# Patient Record
Sex: MALE | Race: WHITE | NOT HISPANIC OR LATINO | Employment: FULL TIME | URBAN - METROPOLITAN AREA
[De-identification: names, ages, dates, MRNs, and addresses within clinical notes are randomized per-mention and may not be internally consistent; named-entity substitution may affect disease eponyms.]

---

## 2023-03-21 PROBLEM — R68.89 FLU-LIKE SYMPTOMS: Status: ACTIVE | Noted: 2023-03-21

## 2023-03-21 PROBLEM — E66.812 CLASS 2 SEVERE OBESITY WITH SERIOUS COMORBIDITY AND BODY MASS INDEX (BMI) OF 37.0 TO 37.9 IN ADULT: Status: ACTIVE | Noted: 2023-03-21

## 2023-03-21 PROBLEM — M25.559 HIP PAIN: Status: ACTIVE | Noted: 2023-03-21

## 2023-03-21 PROBLEM — M54.16 LUMBAR RADICULAR PAIN: Status: ACTIVE | Noted: 2023-03-21

## 2023-03-21 PROBLEM — M79.645 PAIN OF FINGER OF LEFT HAND: Status: ACTIVE | Noted: 2023-03-21

## 2023-03-21 PROBLEM — R23.4 SKIN ESCHAR: Status: ACTIVE | Noted: 2023-03-21
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
## 2023-03-21 PROBLEM — M79.606 ACUTE LEG PAIN: Status: ACTIVE | Noted: 2023-03-21

## 2023-03-21 PROBLEM — K21.9 GASTROESOPHAGEAL REFLUX DISEASE: Status: ACTIVE | Noted: 2023-03-21

## 2023-03-21 PROBLEM — M18.12 DEGENERATIVE ARTHRITIS OF THUMB, LEFT: Status: ACTIVE | Noted: 2023-03-21

## 2023-03-21 PROBLEM — M51.35 DDD (DEGENERATIVE DISC DISEASE), THORACOLUMBAR: Status: ACTIVE | Noted: 2023-03-21

## 2023-03-21 PROBLEM — E03.8 SECONDARY HYPOTHYROIDISM: Status: ACTIVE | Noted: 2023-03-21

## 2023-03-21 PROBLEM — E66.01 CLASS 2 SEVERE OBESITY WITH SERIOUS COMORBIDITY AND BODY MASS INDEX (BMI) OF 37.0 TO 37.9 IN ADULT (MULTI): Status: ACTIVE | Noted: 2023-03-21

## 2023-03-21 PROBLEM — M25.562 LEFT KNEE PAIN: Status: ACTIVE | Noted: 2023-03-21

## 2023-03-21 PROBLEM — M54.9 UPPER BACK PAIN ON RIGHT SIDE: Status: ACTIVE | Noted: 2023-03-21

## 2023-03-21 PROBLEM — M51.37 DDD (DEGENERATIVE DISC DISEASE), LUMBOSACRAL: Status: ACTIVE | Noted: 2023-03-21

## 2023-03-21 PROBLEM — J30.9 AR (ALLERGIC RHINITIS): Status: ACTIVE | Noted: 2023-03-21

## 2023-03-21 PROBLEM — N52.9 MALE ERECTILE DISORDER: Status: ACTIVE | Noted: 2023-03-21

## 2023-03-21 PROBLEM — M51.379 DDD (DEGENERATIVE DISC DISEASE), LUMBOSACRAL: Status: ACTIVE | Noted: 2023-03-21

## 2023-03-21 PROBLEM — E78.5 DYSLIPIDEMIA: Status: ACTIVE | Noted: 2023-03-21

## 2023-03-21 PROBLEM — R35.1 NOCTURIA: Status: ACTIVE | Noted: 2023-03-21

## 2023-03-21 PROBLEM — M18.11 DEGENERATIVE ARTHRITIS OF THUMB, RIGHT: Status: ACTIVE | Noted: 2023-03-21

## 2023-03-21 PROBLEM — I10 BENIGN ESSENTIAL HTN: Status: ACTIVE | Noted: 2023-03-21

## 2023-03-21 PROBLEM — M25.579 ANKLE PAIN: Status: ACTIVE | Noted: 2023-03-21

## 2023-03-21 PROBLEM — M54.31 SCIATICA OF RIGHT SIDE: Status: ACTIVE | Noted: 2023-03-21

## 2023-03-21 PROBLEM — S29.019A THORACIC MYOFASCIAL STRAIN: Status: ACTIVE | Noted: 2023-03-21

## 2023-03-21 RX ORDER — LISINOPRIL 5 MG/1
1 TABLET ORAL DAILY
COMMUNITY
Start: 2014-08-19 | End: 2023-05-08 | Stop reason: SDUPTHER

## 2023-03-21 RX ORDER — LEVOTHYROXINE SODIUM 50 UG/1
TABLET ORAL
COMMUNITY
Start: 2014-08-19 | End: 2023-05-08 | Stop reason: SDUPTHER

## 2023-03-21 RX ORDER — ALBUTEROL SULFATE 90 UG/1
2 AEROSOL, METERED RESPIRATORY (INHALATION) EVERY 6 HOURS
COMMUNITY
End: 2023-05-08 | Stop reason: SDUPTHER

## 2023-03-21 RX ORDER — METHOCARBAMOL 500 MG/1
1 TABLET, FILM COATED ORAL 3 TIMES DAILY
COMMUNITY
End: 2023-09-25 | Stop reason: ALTCHOICE

## 2023-03-27 ENCOUNTER — OFFICE VISIT (OUTPATIENT)
Dept: PRIMARY CARE | Facility: CLINIC | Age: 68
End: 2023-03-27
Payer: COMMERCIAL

## 2023-03-27 VITALS
SYSTOLIC BLOOD PRESSURE: 130 MMHG | OXYGEN SATURATION: 97 % | HEIGHT: 67 IN | DIASTOLIC BLOOD PRESSURE: 82 MMHG | HEART RATE: 60 BPM | WEIGHT: 232 LBS | BODY MASS INDEX: 36.41 KG/M2 | TEMPERATURE: 98 F

## 2023-03-27 DIAGNOSIS — Z00.00 VISIT FOR PREVENTIVE HEALTH EXAMINATION: Primary | ICD-10-CM

## 2023-03-27 DIAGNOSIS — F17.200 SMOKER: ICD-10-CM

## 2023-03-27 DIAGNOSIS — Z12.11 COLON CANCER SCREENING: ICD-10-CM

## 2023-03-27 PROCEDURE — 99397 PER PM REEVAL EST PAT 65+ YR: CPT | Performed by: FAMILY MEDICINE

## 2023-03-27 PROCEDURE — 1160F RVW MEDS BY RX/DR IN RCRD: CPT | Performed by: FAMILY MEDICINE

## 2023-03-27 PROCEDURE — 3079F DIAST BP 80-89 MM HG: CPT | Performed by: FAMILY MEDICINE

## 2023-03-27 PROCEDURE — 4004F PT TOBACCO SCREEN RCVD TLK: CPT | Performed by: FAMILY MEDICINE

## 2023-03-27 PROCEDURE — 3075F SYST BP GE 130 - 139MM HG: CPT | Performed by: FAMILY MEDICINE

## 2023-03-27 PROCEDURE — 1159F MED LIST DOCD IN RCRD: CPT | Performed by: FAMILY MEDICINE

## 2023-03-27 NOTE — PROGRESS NOTES
Subjective   Patient ID: Miki Brown is a 67 y.o. male who presents for Annual Exam, Hypertension (t), Thyroid Problem, and GERD.  Is pt fasting? yes  Does pt see any providers other than care team below: none    No care team member to display    Very pleasant 67-year-old here today for annual wellness exam  No hospital ER visits surgery or interval changes  He had COVID but recovered  He is a smoker and is trying to quit  No new family history no falls or accidents no new concerns  No difficulty urinating no angina palpitations or syncope no change in bowel or bladder habits      Last labs-  Due for labs- yes    Cholesterol   Date Value Ref Range Status   03/20/2018 183 0 - 199 mg/dL Final     Comment:     .      AGE      DESIRABLE   BORDERLINE HIGH   HIGH     0-19 Y     0 - 169       170 - 199     >/= 200    20-24 Y     0 - 189       190 - 224     >/= 225         >24 Y     0 - 199       200 - 239     >/= 240   **All ranges are based on fasting samples. Specific   therapeutic targets will vary based on patient-specific   cardiac risk.  .   Pediatric guidelines reference:Pediatrics 2011, 128(S5).   Adult guidelines reference: NCEP ATPIII Guidelines,     RACHEL 2001, 258:2486-97  .   Venipuncture immediately after or during the    administration of Metamizole may lead to falsely   low results. Testing should be performed immediately   prior to Metamizole dosing.       Triglycerides   Date Value Ref Range Status   03/20/2018 78 0 - 149 mg/dL Final     Comment:     .      AGE      DESIRABLE   BORDERLINE HIGH   HIGH     VERY HIGH   0 D-90 D    19 - 174         ----         ----        ----  91 D- 9 Y     0 -  74        75 -  99     >/= 100      ----    10-19 Y     0 -  89        90 - 129     >/= 130      ----    20-24 Y     0 - 114       115 - 149     >/= 150      ----         >24 Y     0 - 149       150 - 199    200- 499    >/= 500  .   Venipuncture immediately after or during the    administration of Metamizole may  lead to falsely   low results. Testing should be performed immediately   prior to Metamizole dosing.       HDL   Date Value Ref Range Status   03/20/2018 50.6 mg/dL Final     Comment:     .      AGE      VERY LOW   LOW     NORMAL    HIGH       0-19 Y       < 35   < 40     40-45     ----    20-24 Y       ----   < 40       >45     ----      >24 Y       ----   < 40     40-60      >60  .       No results found for: LDL  TSH   Date Value Ref Range Status   03/20/2018 0.59 0.44 - 3.98 mIU/L Final     Comment:      TSH testing is performed using different testing    methodology at [facility] than at other    [facility] hospitals. Direct result comparisons should    only be made within the same method.  .   Patients receiving more than 5 mg/day of biotin may have interference   in test results.  A sample should be taken no sooner than eight hours   after  previous dose. Contact [phone] for additional information.       No components found for: A1C  No components found for: POCA1C  No results found for: ALBUR  No components found for: POCALBUR      Other concerns:    bps at home- stable and normal    ER/urgicare visits in the last year- none  Hospitalizations in the last year- none      last Pap- (age 21-29 q3yrs pap only; age 21-24 gc/chl; age 30-65 q5yrs pap/hpv; age 66+ stop if 3 neg pap or 2 neg HPV within 10yrs and last one in last 5yrs and no h/o VISHAL 2+; stop if hyster with cervix removed and no cervical ca h/o or no high grade pre-cancer history)-n/a  H/o abn pap-  Frequency-  Duration-  Heavy periods-  Abn uterine bleeding-  Dysmenorrhea-  FH ovarian, endometrial, cervical, uterine ca-    Current birth control method-  No change in contraception desired    urine or stool incontinence-  Postmenopausal bleeding/spotting-    last mammo- (40-75/90)  Self breast exams-  Offer CBE 20-39; 40-65+  FH br ca-    last colonoscopy/cologuard/FIT (45-75)  FH colon ca-  FH prostate ca-    last bone density-(age  65-85) or if fx after age 50)    lung ca screening (age 50-75 and 1 ppd x 20yrs and currently smoke or quit within 15yrs)    AAA screening-men 65-75 who smoked >100 cigs in a lifetime)    Exercise- rides bike   Diet-fairly healthy   Body mass index is 36.34 kg/m².    last eye dr appt-   No vision issues    last dental appt- regularly    BMs-regular  Sleep-able to fall asleep and stay asleep; no snoring or apnea  no cp, swelling, sob, abd pain, n/v/d/c, blood in stool or black stools  STI testing including hiv (age 15-65) and hep c screening (18-79)-colon up to date   PSA 50-85      Immunization History   Administered Date(s) Administered    DTaP, Unspecified 10/12/2000    Influenza, seasonal, injectable 09/01/2022    Moderna SARS-CoV-2 Vaccination 04/16/2021, 05/14/2021, 11/19/2021, 04/12/2022    Moderna Sars-cov-2 Bivalent Booster 09/19/2022    Pneumococcal Polysaccharide PPSV23 11/30/2016    TD (adult), 2 Lf tetanus toxoid, preservative free, adsorbed 01/15/2008    Tdap 03/20/2018    Zoster, Unspecified 05/28/2020, 07/28/2020    Zoster, live 09/09/2015, 11/10/2015       fractures in lifetime-none  FH hip/spine/wrist/humerus fx in mom, dad or sibs-    FH heart attack, heart surgery-yes sister abnd brother  FH stroke-no    The ASCVD Risk score (Shakila DK, et al., 2019) failed to calculate for the following reasons:    Cannot find a previous HDL lab    Cannot find a previous total cholesterol lab  Coronary Artery Calcium score:  This test is recommended for men 45 or older and women 55 or older without a history of heart disease and have 1 risk factor (high LDL cholesterol, low HDL cholesterol, high blood pressure, smoker (current or past), type 2 diabetes, IBD, lupus, RA, ankylosing spondylitis, psoriasis or family history of  heart disease <55yrs in dad, brother or child or <65yrs in mom, sister, or child.)       Review of Systems  Constitutional: no chills, no fever and no night sweats.   Eyes: no blurred vision  and no eyesight problems.   ENT: no hearing loss, no nasal congestion, no nasal discharge, no hoarseness and no sore throat.   Cardiovascular: no chest pain, no intermittent leg claudication, no lower extremity edema, no palpitations and no syncope.   Respiratory: no cough, no shortness of breath during exertion, no shortness of breath at rest and no wheezing.   Gastrointestinal: no abdominal pain, no blood in stools, no constipation, no diarrhea, no melena, no nausea, no rectal pain and no vomiting.   Genitourinary: no dysuria, no change in urinary frequency, no urinary hesitancy, no feelings of urinary urgency and no vaginal discharge.   Musculoskeletal: no arthralgias,  no back pain and no myalgias.   Integumentary: no new skin lesions and no rashes.   Neurological: no difficulty walking, no headache, no limb weakness, no numbness and no tingling.   Psychiatric: no anxiety, no depression, no anhedonia and no substance use disorders.   Endocrine: no recent weight gain and no recent weight loss.   Hematologic/Lymphatic: no tendency for easy bruising and no swollen glands .  Objective   Visit Vitals  /82   Pulse 60   Temp 36.7 °C (98 °F) (Oral)      BP Readings from Last 3 Encounters:   03/27/23 130/82   11/16/22 153/86   03/25/22 120/74     Wt Readings from Last 3 Encounters:   03/27/23 105 kg (232 lb)   11/16/22 99.8 kg (220 lb)   03/25/22 103 kg (226 lb 12.8 oz)           Physical Exam  The patient appeared well nourished and normally developed. Vital signs as documented. Head exam is unremarkable. No scleral icterus or corneal arcus noted.  Pupils are equal round reactive to light extraocular movements are intact no hemorrhages noted on funduscopic exam mouth mucous membranes are moist no exudates ears canals clear TMs are gray pearly not injected nose no rhinorrhea or epistaxis Neck is without jugular venous distension, thyromegaly, or carotid bruits. Carotid upstrokes are brisk bilaterally. Lungs are  clear to auscultation and percussion. Cardiac exam reveals the PMI to be normally sized and situated. Rhythm is regular. First and second heart sounds normal. No murmurs, rubs or gallops. Abdominal exam reveals normal bowel sounds, no masses, no organomegaly and no aortic enlargement. Extremities are nonedematous and both femoral and pedal pulses are normal.  Neurologic exam DTRs are equal bilaterally no focal deficits strength is symmetrical heme lymph no palpable lymph nodes in the neck axilla or groin  Assessment/Plan   Diagnoses and all orders for this visit:  Visit for preventive health examination  -     Comprehensive Metabolic Panel; Future  -     Prostate Spec.Ag,Screen; Future  -     Lipid Panel; Future  -     Hemoglobin A1C; Future  -     Hepatitis C antibody; Future  Colon cancer screening  Smoker  -     Vascular US abdominal aorta anuerysm AAA screening; Future

## 2023-03-28 LAB
NON-UH HIE A/G RATIO: 1.4
NON-UH HIE ALB: 4.3 G/DL (ref 3.4–5)
NON-UH HIE ALK PHOS: 97 UNIT/L (ref 46–116)
NON-UH HIE BILIRUBIN, TOTAL: 0.7 MG/DL (ref 0.2–1)
NON-UH HIE BUN/CREAT RATIO: 15.4
NON-UH HIE BUN: 20 MG/DL (ref 9–23)
NON-UH HIE CALCIUM: 9.8 MG/DL (ref 8.7–10.4)
NON-UH HIE CALCULATED LDL CHOLESTEROL: 106 MG/DL (ref 60–130)
NON-UH HIE CALCULATED OSMOLALITY: 282 MOSM/KG (ref 275–295)
NON-UH HIE CHLORIDE: 106 MMOL/L (ref 98–107)
NON-UH HIE CHOLESTEROL: 176 MG/DL (ref 100–200)
NON-UH HIE CO2, VENOUS: 29 MMOL/L (ref 20–31)
NON-UH HIE CREATININE: 1.3 MG/DL (ref 0.6–1.1)
NON-UH HIE GFR AA: >60
NON-UH HIE GLOBULIN: 3.1 G/DL
NON-UH HIE GLOMERULAR FILTRATION RATE: 55 ML/MIN/1.73M?
NON-UH HIE GLUCOSE: 91 MG/DL (ref 74–106)
NON-UH HIE GOT: 32 UNIT/L (ref 15–37)
NON-UH HIE GPT: 28 UNIT/L (ref 10–49)
NON-UH HIE HDL CHOLESTEROL: 54 MG/DL (ref 40–60)
NON-UH HIE HEPATITIS C ANTIBODY: NONREACTIVE
NON-UH HIE HGB A1C: 5.7 %
NON-UH HIE K: 4.3 MMOL/L (ref 3.5–5.1)
NON-UH HIE NA: 140 MMOL/L (ref 135–145)
NON-UH HIE PROSTATIC SPECIFIC AG SCREEN: 0.3 NG/ML (ref 0–4)
NON-UH HIE TOTAL CHOL/HDL CHOL RATIO: 3.3
NON-UH HIE TOTAL PROTEIN: 7.4 G/DL (ref 5.7–8.2)
NON-UH HIE TRIGLYCERIDES: 79 MG/DL (ref 30–150)

## 2023-03-30 ASSESSMENT — ENCOUNTER SYMPTOMS
DEPRESSION: 0
OCCASIONAL FEELINGS OF UNSTEADINESS: 0
LOSS OF SENSATION IN FEET: 0

## 2023-05-08 ENCOUNTER — TELEPHONE (OUTPATIENT)
Dept: PRIMARY CARE | Facility: CLINIC | Age: 68
End: 2023-05-08
Payer: COMMERCIAL

## 2023-05-08 DIAGNOSIS — E03.8 SECONDARY HYPOTHYROIDISM: ICD-10-CM

## 2023-05-08 DIAGNOSIS — J44.9 CHRONIC OBSTRUCTIVE PULMONARY DISEASE, UNSPECIFIED COPD TYPE (MULTI): Primary | ICD-10-CM

## 2023-05-08 DIAGNOSIS — I10 BENIGN ESSENTIAL HTN: ICD-10-CM

## 2023-05-08 RX ORDER — LISINOPRIL 5 MG/1
5 TABLET ORAL DAILY
Qty: 90 TABLET | Refills: 0 | Status: SHIPPED | OUTPATIENT
Start: 2023-05-08 | End: 2023-08-02 | Stop reason: SDUPTHER

## 2023-05-08 RX ORDER — ALBUTEROL SULFATE 90 UG/1
2 AEROSOL, METERED RESPIRATORY (INHALATION) EVERY 6 HOURS
Qty: 18 G | Refills: 0 | Status: SHIPPED | OUTPATIENT
Start: 2023-05-08 | End: 2024-02-14 | Stop reason: SDUPTHER

## 2023-05-08 RX ORDER — LEVOTHYROXINE SODIUM 50 UG/1
50 TABLET ORAL
Qty: 90 TABLET | Refills: 0 | Status: SHIPPED | OUTPATIENT
Start: 2023-05-08 | End: 2023-08-02 | Stop reason: SDUPTHER

## 2023-08-02 ENCOUNTER — TELEPHONE (OUTPATIENT)
Dept: PRIMARY CARE | Facility: CLINIC | Age: 68
End: 2023-08-02
Payer: COMMERCIAL

## 2023-08-02 DIAGNOSIS — E03.8 SECONDARY HYPOTHYROIDISM: ICD-10-CM

## 2023-08-02 DIAGNOSIS — I10 BENIGN ESSENTIAL HTN: ICD-10-CM

## 2023-08-02 RX ORDER — LISINOPRIL 5 MG/1
5 TABLET ORAL DAILY
Qty: 90 TABLET | Refills: 0 | Status: SHIPPED | OUTPATIENT
Start: 2023-08-02 | End: 2023-08-07 | Stop reason: DRUGHIGH

## 2023-08-02 RX ORDER — LEVOTHYROXINE SODIUM 50 UG/1
50 TABLET ORAL
Qty: 90 TABLET | Refills: 0 | Status: SHIPPED | OUTPATIENT
Start: 2023-08-02 | End: 2023-10-30

## 2023-08-07 ENCOUNTER — LAB (OUTPATIENT)
Dept: LAB | Facility: LAB | Age: 68
End: 2023-08-07
Payer: COMMERCIAL

## 2023-08-07 ENCOUNTER — OFFICE VISIT (OUTPATIENT)
Dept: PRIMARY CARE | Facility: CLINIC | Age: 68
End: 2023-08-07
Payer: COMMERCIAL

## 2023-08-07 DIAGNOSIS — R59.1 LYMPHADENOPATHY: ICD-10-CM

## 2023-08-07 DIAGNOSIS — I10 BENIGN ESSENTIAL HTN: ICD-10-CM

## 2023-08-07 DIAGNOSIS — M79.89 SOFT TISSUE MASS: Primary | ICD-10-CM

## 2023-08-07 PROCEDURE — 1125F AMNT PAIN NOTED PAIN PRSNT: CPT | Performed by: FAMILY MEDICINE

## 2023-08-07 PROCEDURE — 36415 COLL VENOUS BLD VENIPUNCTURE: CPT

## 2023-08-07 PROCEDURE — 1160F RVW MEDS BY RX/DR IN RCRD: CPT | Performed by: FAMILY MEDICINE

## 2023-08-07 PROCEDURE — 3077F SYST BP >= 140 MM HG: CPT | Performed by: FAMILY MEDICINE

## 2023-08-07 PROCEDURE — 4004F PT TOBACCO SCREEN RCVD TLK: CPT | Performed by: FAMILY MEDICINE

## 2023-08-07 PROCEDURE — 3079F DIAST BP 80-89 MM HG: CPT | Performed by: FAMILY MEDICINE

## 2023-08-07 PROCEDURE — 85025 COMPLETE CBC W/AUTO DIFF WBC: CPT

## 2023-08-07 PROCEDURE — 83615 LACTATE (LD) (LDH) ENZYME: CPT

## 2023-08-07 PROCEDURE — 1159F MED LIST DOCD IN RCRD: CPT | Performed by: FAMILY MEDICINE

## 2023-08-07 PROCEDURE — 80053 COMPREHEN METABOLIC PANEL: CPT

## 2023-08-07 PROCEDURE — 99214 OFFICE O/P EST MOD 30 MIN: CPT | Performed by: FAMILY MEDICINE

## 2023-08-07 RX ORDER — LISINOPRIL 10 MG/1
10 TABLET ORAL DAILY
Qty: 90 TABLET | Refills: 3 | Status: SHIPPED | OUTPATIENT
Start: 2023-08-07 | End: 2024-02-14 | Stop reason: SDUPTHER

## 2023-08-07 ASSESSMENT — PATIENT HEALTH QUESTIONNAIRE - PHQ9
SUM OF ALL RESPONSES TO PHQ9 QUESTIONS 1 AND 2: 0
1. LITTLE INTEREST OR PLEASURE IN DOING THINGS: NOT AT ALL
2. FEELING DOWN, DEPRESSED OR HOPELESS: NOT AT ALL

## 2023-08-07 NOTE — PROGRESS NOTES
Subjective   Patient ID: Miki Brown is a 67 y.o. male who presents for Mass (Pt has a bump in the back on the neck.).  Very pleasant 67-year-old here today with mass on the back of his neck  Seems to be increasing in size  Has noticed it for the last 6 months  He also noticed some tender lymph nodes in his neck as well  No fever chills or weight loss  Patient is a former smoker  No difficulty swallowing no change in voice no cough  Follow-up hypertension  Has had for many years  Well-controlled on lisinopril  No side effects reported no dry cough  No angina palpitations or syncope        Review of Systems  Constitutional: no chills, no fever and no night sweats.   Eyes: no blurred vision and no eyesight problems.   ENT: no hearing loss, no nasal congestion, no nasal discharge, no hoarseness and no sore throat.   Cardiovascular: no chest pain, no intermittent leg claudication, no lower extremity edema, no palpitations and no syncope.   Respiratory: no cough, no shortness of breath during exertion, no shortness of breath at rest and no wheezing.   Gastrointestinal: no abdominal pain, no blood in stools, no constipation, no diarrhea, no melena, no nausea, no rectal pain and no vomiting.   Genitourinary: no dysuria, no change in urinary frequency, no urinary hesitancy, no feelings of urinary urgency and no vaginal discharge.   Musculoskeletal: no arthralgias,  no back pain and no myalgias.   Integumentary: no new skin lesions and no rashes.   Neurological: no difficulty walking, no headache, no limb weakness, no numbness and no tingling.   Psychiatric: no anxiety, no depression, no anhedonia and no substance use disorders.   Endocrine: no recent weight gain and no recent weight loss.   Hematologic/Lymphatic: no tendency for easy bruising and no swollen glands .    Objective    /87   Pulse 63   Temp 36.4 °C (97.5 °F)   Wt 105 kg (232 lb)   BMI 36.34 kg/m²    Physical Exam  The patient appeared well  nourished and normally developed. Vital signs as documented. Head exam is unremarkable. No scleral icterus or corneal arcus noted.  Pupils are equal round reactive to light extraocular movements are intact no hemorrhages noted on funduscopic exam mouth mucous membranes are moist no exudates ears canals clear TMs are gray pearly not injected nose no rhinorrhea or epistaxis Neck is without jugular venous distension, thyromegaly, or carotid bruits. Carotid upstrokes are brisk bilaterally. Lungs are clear to auscultation and percussion. Cardiac exam reveals the PMI to be normally sized and situated. Rhythm is regular. First and second heart sounds normal. No murmurs, rubs or gallops. Abdominal exam reveals normal bowel sounds, no masses, no organomegaly and no aortic enlargement. Extremities are nonedematous and both femoral and pedal pulses are normal.  Neurologic exam DTRs are equal bilaterally no focal deficits strength is symmetrical heme lymph no palpable lymph nodes in the neck axilla or groin soft tissue mass posterior neck shotty lymph nodes anterior and posterior chain bilaterally no skin changes or ulcerations    Assessment/Plan   Problem List Items Addressed This Visit       Benign essential HTN - Primary    Relevant Medications    lisinopril 10 mg tablet    Lymphadenopathy    Relevant Orders    CBC and Auto Differential (Completed)    Comprehensive Metabolic Panel (Completed)    Lactate dehydrogenase (Completed)    Soft tissue mass    Relevant Orders    Referral to General Surgery            Nicole Platt MD

## 2023-08-08 LAB
ALANINE AMINOTRANSFERASE (SGPT) (U/L) IN SER/PLAS: 19 U/L (ref 10–52)
ALBUMIN (G/DL) IN SER/PLAS: 4.2 G/DL (ref 3.4–5)
ALKALINE PHOSPHATASE (U/L) IN SER/PLAS: 81 U/L (ref 33–136)
ANION GAP IN SER/PLAS: 11 MMOL/L (ref 10–20)
ASPARTATE AMINOTRANSFERASE (SGOT) (U/L) IN SER/PLAS: 22 U/L (ref 9–39)
BASOPHILS (10*3/UL) IN BLOOD BY AUTOMATED COUNT: 0.06 X10E9/L (ref 0–0.1)
BASOPHILS/100 LEUKOCYTES IN BLOOD BY AUTOMATED COUNT: 0.7 % (ref 0–2)
BILIRUBIN TOTAL (MG/DL) IN SER/PLAS: 0.5 MG/DL (ref 0–1.2)
CALCIUM (MG/DL) IN SER/PLAS: 9.3 MG/DL (ref 8.6–10.6)
CARBON DIOXIDE, TOTAL (MMOL/L) IN SER/PLAS: 29 MMOL/L (ref 21–32)
CHLORIDE (MMOL/L) IN SER/PLAS: 104 MMOL/L (ref 98–107)
CREATININE (MG/DL) IN SER/PLAS: 1.13 MG/DL (ref 0.5–1.3)
EOSINOPHILS (10*3/UL) IN BLOOD BY AUTOMATED COUNT: 0.21 X10E9/L (ref 0–0.7)
EOSINOPHILS/100 LEUKOCYTES IN BLOOD BY AUTOMATED COUNT: 2.4 % (ref 0–6)
ERYTHROCYTE DISTRIBUTION WIDTH (RATIO) BY AUTOMATED COUNT: 13.6 % (ref 11.5–14.5)
ERYTHROCYTE MEAN CORPUSCULAR HEMOGLOBIN CONCENTRATION (G/DL) BY AUTOMATED: 32.4 G/DL (ref 32–36)
ERYTHROCYTE MEAN CORPUSCULAR VOLUME (FL) BY AUTOMATED COUNT: 94 FL (ref 80–100)
ERYTHROCYTES (10*6/UL) IN BLOOD BY AUTOMATED COUNT: 5.23 X10E12/L (ref 4.5–5.9)
GFR MALE: 71 ML/MIN/1.73M2
GLUCOSE (MG/DL) IN SER/PLAS: 95 MG/DL (ref 74–99)
HEMATOCRIT (%) IN BLOOD BY AUTOMATED COUNT: 49 % (ref 41–52)
HEMOGLOBIN (G/DL) IN BLOOD: 15.9 G/DL (ref 13.5–17.5)
IMMATURE GRANULOCYTES/100 LEUKOCYTES IN BLOOD BY AUTOMATED COUNT: 0.4 % (ref 0–0.9)
LACTATE DEHYDROGENASE (U/L) IN SER/PLAS BY LAC->PYR RXN: 152 U/L (ref 84–246)
LEUKOCYTES (10*3/UL) IN BLOOD BY AUTOMATED COUNT: 8.9 X10E9/L (ref 4.4–11.3)
LYMPHOCYTES (10*3/UL) IN BLOOD BY AUTOMATED COUNT: 2.23 X10E9/L (ref 1.2–4.8)
LYMPHOCYTES/100 LEUKOCYTES IN BLOOD BY AUTOMATED COUNT: 25 % (ref 13–44)
MONOCYTES (10*3/UL) IN BLOOD BY AUTOMATED COUNT: 1 X10E9/L (ref 0.1–1)
MONOCYTES/100 LEUKOCYTES IN BLOOD BY AUTOMATED COUNT: 11.2 % (ref 2–10)
NEUTROPHILS (10*3/UL) IN BLOOD BY AUTOMATED COUNT: 5.38 X10E9/L (ref 1.2–7.7)
NEUTROPHILS/100 LEUKOCYTES IN BLOOD BY AUTOMATED COUNT: 60.3 % (ref 40–80)
NRBC (PER 100 WBCS) BY AUTOMATED COUNT: 0 /100 WBC (ref 0–0)
PLATELETS (10*3/UL) IN BLOOD AUTOMATED COUNT: 274 X10E9/L (ref 150–450)
POTASSIUM (MMOL/L) IN SER/PLAS: 4.3 MMOL/L (ref 3.5–5.3)
PROTEIN TOTAL: 7.1 G/DL (ref 6.4–8.2)
SODIUM (MMOL/L) IN SER/PLAS: 140 MMOL/L (ref 136–145)
UREA NITROGEN (MG/DL) IN SER/PLAS: 15 MG/DL (ref 6–23)

## 2023-08-30 VITALS
TEMPERATURE: 97.5 F | DIASTOLIC BLOOD PRESSURE: 87 MMHG | SYSTOLIC BLOOD PRESSURE: 151 MMHG | BODY MASS INDEX: 36.34 KG/M2 | HEART RATE: 63 BPM | WEIGHT: 232 LBS

## 2023-08-30 PROBLEM — R59.1 LYMPHADENOPATHY: Status: ACTIVE | Noted: 2023-08-30

## 2023-08-30 PROBLEM — M79.89 SOFT TISSUE MASS: Status: ACTIVE | Noted: 2023-08-30

## 2023-08-30 NOTE — PATIENT INSTRUCTIONS
You have a swollen mass on the side of your neck with some mild lymph nodes  I recommend an ultrasound of this area and a consultation with general surgery to see about having the mass removed  Follow-up with me after consultation  Hypertension stable continue lisinopril

## 2023-09-25 ENCOUNTER — OFFICE VISIT (OUTPATIENT)
Dept: PRIMARY CARE | Facility: CLINIC | Age: 68
End: 2023-09-25
Payer: COMMERCIAL

## 2023-09-25 VITALS
SYSTOLIC BLOOD PRESSURE: 138 MMHG | WEIGHT: 230 LBS | HEART RATE: 70 BPM | OXYGEN SATURATION: 96 % | BODY MASS INDEX: 36.02 KG/M2 | DIASTOLIC BLOOD PRESSURE: 85 MMHG

## 2023-09-25 DIAGNOSIS — R10.31 GROIN PAIN, RIGHT: Primary | ICD-10-CM

## 2023-09-25 PROCEDURE — 90471 IMMUNIZATION ADMIN: CPT | Performed by: NURSE PRACTITIONER

## 2023-09-25 PROCEDURE — 1159F MED LIST DOCD IN RCRD: CPT | Performed by: NURSE PRACTITIONER

## 2023-09-25 PROCEDURE — 99213 OFFICE O/P EST LOW 20 MIN: CPT | Performed by: NURSE PRACTITIONER

## 2023-09-25 PROCEDURE — 3075F SYST BP GE 130 - 139MM HG: CPT | Performed by: NURSE PRACTITIONER

## 2023-09-25 PROCEDURE — 1125F AMNT PAIN NOTED PAIN PRSNT: CPT | Performed by: NURSE PRACTITIONER

## 2023-09-25 PROCEDURE — 3079F DIAST BP 80-89 MM HG: CPT | Performed by: NURSE PRACTITIONER

## 2023-09-25 PROCEDURE — 90662 IIV NO PRSV INCREASED AG IM: CPT | Performed by: NURSE PRACTITIONER

## 2023-09-25 PROCEDURE — 4004F PT TOBACCO SCREEN RCVD TLK: CPT | Performed by: NURSE PRACTITIONER

## 2023-09-25 PROCEDURE — 1160F RVW MEDS BY RX/DR IN RCRD: CPT | Performed by: NURSE PRACTITIONER

## 2023-09-25 RX ORDER — ORPHENADRINE CITRATE 100 MG/1
100 TABLET, EXTENDED RELEASE ORAL 2 TIMES DAILY PRN
Qty: 60 TABLET | Refills: 5 | Status: SHIPPED | OUTPATIENT
Start: 2023-09-25 | End: 2024-03-23

## 2023-09-25 RX ORDER — PREDNISONE 20 MG/1
TABLET ORAL
Qty: 20 TABLET | Refills: 0 | Status: SHIPPED | OUTPATIENT
Start: 2023-09-25

## 2023-09-25 ASSESSMENT — ENCOUNTER SYMPTOMS
WHEEZING: 0
CONSTITUTIONAL NEGATIVE: 1
SHORTNESS OF BREATH: 0

## 2023-09-25 NOTE — PATIENT INSTRUCTIONS
Orphenadrine-take first dose when at home not driving or working to make sure it doesn't make you sleepy  Prednisone  No advil, motrin, ibuprofen, aleve, naproxen or other anti-inflammatories while on prednisone.  Tylenol (acetaminophen) is OK to take.   Refer to [facility] for wellness appt in march      I will communicate with you via [employer] regarding messages and results. If you need help with this, you can call the support line at [phone].    IT WAS A PLEASURE TO SEE YOU TODAY. THANK YOU FOR CHOOSING US FOR YOUR HEALTHCARE NEEDS.

## 2023-09-25 NOTE — PROGRESS NOTES
Subjective   Patient ID: Miki Brown is a 68 y.o. male who presents for Muscle Pain.  Last physical:  3/27/23  Does pt want flu shot?      HPI  Pulled muscle in right groin area, Aching pain in right groin to front of thigh , limited ROM x 3d  No pain in genitl area  Not sure how did this. No fall or injury  3rd time this has happened  Went to chiropractor -last appt this am-helped a lot    no new work activities or exercise routine  pain worse with side to side leg mvmts and extension of leg; feels tight in am  pain right now 0/10  pain this am before chiropractor 4/10  no redness, rash, warmth, bruising or swelling  no numbness, tingling, or weakness  feel like leg is going to give out  able to bear wt  no wt loss, fever, or chills  No back pain  no new bowel or bladder problems-no urinary incontinence or urinary retention or fecal incontinence; no uti sx; no abd pain; no progressive weakness or impaired coordination  no recent spinal procedure or IV drug use    selftxt: ice, chiropractor, advil, using a walker-for balance      No care team member to display     Review of Systems   Constitutional: Negative.    Respiratory:  Negative for shortness of breath and wheezing.    Cardiovascular:  Negative for chest pain.   Musculoskeletal:         Rt groin pain       Objective   Visit Vitals  /85   Pulse 70      BP Readings from Last 3 Encounters:   09/25/23 138/85   08/07/23 151/87   03/27/23 130/82     Wt Readings from Last 3 Encounters:   09/25/23 104 kg (230 lb)   08/07/23 105 kg (232 lb)   03/27/23 105 kg (232 lb)       Physical Exam  Constitutional:       General: He is not in acute distress.     Appearance: Normal appearance.   Musculoskeletal:      Comments: Rt groin and rt front of thigh with no pain to palpation.  Pain to lift rt leg in front of him  No redness rash or swelling noted   Neurological:      Mental Status: He is alert.       Assessment/Plan   Diagnoses and all orders for this visit:  Groin  pain, right  -     Referral to Orthopaedic Surgery; Future  -     predniSONE (Deltasone) 20 mg tablet; 3 tabs daily x3d then 2 tabs daily x 3d then 1 tab daily x 3d then 1/2 tab daily x 3d with food  -     orphenadrine (Norflex) 100 mg 12 hr tablet; Take 1 tablet (100 mg) by mouth 2 times a day as needed for muscle spasms (or back pain). Do not crush, chew, or split.  Other orders  -     Flu vaccine, quadrivalent, high-dose, preservative free, age 65y+ (FLUZONE)  -     Follow Up In Primary Care - Health Maintenance; Future

## 2023-11-20 ENCOUNTER — OFFICE VISIT (OUTPATIENT)
Dept: PRIMARY CARE | Facility: CLINIC | Age: 68
End: 2023-11-20
Payer: COMMERCIAL

## 2023-11-20 VITALS
TEMPERATURE: 97.8 F | HEIGHT: 67 IN | OXYGEN SATURATION: 94 % | BODY MASS INDEX: 36.54 KG/M2 | SYSTOLIC BLOOD PRESSURE: 134 MMHG | HEART RATE: 61 BPM | WEIGHT: 232.8 LBS | DIASTOLIC BLOOD PRESSURE: 86 MMHG

## 2023-11-20 DIAGNOSIS — K40.90 NON-RECURRENT UNILATERAL INGUINAL HERNIA WITHOUT OBSTRUCTION OR GANGRENE: Primary | ICD-10-CM

## 2023-11-20 PROCEDURE — 1125F AMNT PAIN NOTED PAIN PRSNT: CPT | Performed by: FAMILY MEDICINE

## 2023-11-20 PROCEDURE — 3075F SYST BP GE 130 - 139MM HG: CPT | Performed by: FAMILY MEDICINE

## 2023-11-20 PROCEDURE — 1159F MED LIST DOCD IN RCRD: CPT | Performed by: FAMILY MEDICINE

## 2023-11-20 PROCEDURE — 4004F PT TOBACCO SCREEN RCVD TLK: CPT | Performed by: FAMILY MEDICINE

## 2023-11-20 PROCEDURE — 3079F DIAST BP 80-89 MM HG: CPT | Performed by: FAMILY MEDICINE

## 2023-11-20 PROCEDURE — 99213 OFFICE O/P EST LOW 20 MIN: CPT | Performed by: FAMILY MEDICINE

## 2023-11-20 PROCEDURE — 1160F RVW MEDS BY RX/DR IN RCRD: CPT | Performed by: FAMILY MEDICINE

## 2023-11-20 RX ORDER — IBUPROFEN 800 MG/1
800 TABLET ORAL 3 TIMES DAILY PRN
Qty: 180 TABLET | Refills: 3 | Status: SHIPPED | OUTPATIENT
Start: 2023-11-20 | End: 2024-02-14 | Stop reason: SDUPTHER

## 2023-11-20 ASSESSMENT — PATIENT HEALTH QUESTIONNAIRE - PHQ9
1. LITTLE INTEREST OR PLEASURE IN DOING THINGS: NOT AT ALL
SUM OF ALL RESPONSES TO PHQ9 QUESTIONS 1 AND 2: 0
2. FEELING DOWN, DEPRESSED OR HOPELESS: NOT AT ALL

## 2023-11-20 ASSESSMENT — COLUMBIA-SUICIDE SEVERITY RATING SCALE - C-SSRS
1. IN THE PAST MONTH, HAVE YOU WISHED YOU WERE DEAD OR WISHED YOU COULD GO TO SLEEP AND NOT WAKE UP?: NO
2. HAVE YOU ACTUALLY HAD ANY THOUGHTS OF KILLING YOURSELF?: NO
6. HAVE YOU EVER DONE ANYTHING, STARTED TO DO ANYTHING, OR PREPARED TO DO ANYTHING TO END YOUR LIFE?: NO

## 2023-11-20 ASSESSMENT — PAIN SCALES - GENERAL: PAINLEVEL: 4

## 2023-11-20 NOTE — PROGRESS NOTES
"Subjective   Patient ID: Miki Brown is a 68 y.o. male who presents for Groin Injury (Pt thinks he may have injured while coughing x 1 week).  Very pleasant 68 year old with groin pain  Was ill and coughing and noticed the pain  No trauma to the area  2 weeks pain   Feels lumps   No fever or illness  Worse with coughing and sneezing         Review of Systems  Constitutional: no chills, no fever and no night sweats.   Eyes: no blurred vision and no eyesight problems.   ENT: no hearing loss, no nasal congestion, no nasal discharge, no hoarseness and no sore throat.   Cardiovascular: no chest pain, no intermittent leg claudication, no lower extremity edema, no palpitations and no syncope.   Respiratory: no cough, no shortness of breath during exertion, no shortness of breath at rest and no wheezing.   Gastrointestinal: no abdominal pain, no blood in stools, no constipation, no diarrhea, no melena, no nausea, no rectal pain and no vomiting.   Genitourinary: no dysuria, no change in urinary frequency, no urinary hesitancy, no feelings of urinary urgency and no vaginal discharge.   Musculoskeletal: no arthralgias,  no back pain and no myalgias.   Integumentary: no new skin lesions and no rashes.   Neurological: no difficulty walking, no headache, no limb weakness, no numbness and no tingling.   Psychiatric: no anxiety, no depression, no anhedonia and no substance use disorders.   Endocrine: no recent weight gain and no recent weight loss.   Hematologic/Lymphatic: no tendency for easy bruising and no swollen glands .    Objective    /86   Pulse 61   Temp 36.6 °C (97.8 °F) (Temporal)   Ht 1.702 m (5' 7\")   Wt 106 kg (232 lb 12.8 oz)   SpO2 94%   BMI 36.46 kg/m²    Physical Exam  The patient appeared well nourished and normally developed. Vital signs as documented. Head exam is unremarkable. No scleral icterus or corneal arcus noted.  Pupils are equal round reactive to light extraocular movements are intact no " hemorrhages noted on funduscopic exam mouth mucous membranes are moist no exudates ears canals clear TMs are gray pearly not injected nose no rhinorrhea or epistaxis Neck is without jugular venous distension, thyromegaly, or carotid bruits. Carotid upstrokes are brisk bilaterally. Lungs are clear to auscultation and percussion. Cardiac exam reveals the PMI to be normally sized and situated. Rhythm is regular. First and second heart sounds normal. No murmurs, rubs or gallops. Abdominal exam reveals normal bowel sounds, no masses, no organomegaly and no aortic enlargement. Extremities are nonedematous and both femoral and pedal pulses are normal.  Neurologic exam DTRs are equal bilaterally no focal deficits strength is symmetrical heme lymph no palpable lymph nodes in the neck axilla or groin  Pain over inguinal area with small bulge reducible  Assessment/Plan   Problem List Items Addressed This Visit       Non-recurrent unilateral inguinal hernia without obstruction or gangrene - Primary     Inguinal hernia  Consult general surgery  Hold when you cough or sneeze  Followup after consultation         Relevant Medications    ibuprofen 800 mg tablet    Other Relevant Orders    Referral to General Surgery            Nicole Platt MD

## 2023-11-28 ENCOUNTER — OFFICE VISIT (OUTPATIENT)
Dept: SURGERY | Facility: CLINIC | Age: 68
End: 2023-11-28
Payer: COMMERCIAL

## 2023-11-28 DIAGNOSIS — R10.31 RIGHT GROIN PAIN: ICD-10-CM

## 2023-11-28 DIAGNOSIS — R10.32 DEEP LEFT INGUINAL PAIN: Primary | ICD-10-CM

## 2023-11-28 DIAGNOSIS — N50.82 PAIN IN SCROTUM: ICD-10-CM

## 2023-11-28 LAB
NON-UH HIE BUN: 22 MG/DL (ref 9–23)
NON-UH HIE CREATININE: 1.2 MG/DL (ref 0.6–1.1)
NON-UH HIE GFR AA: >60
NON-UH HIE GLOMERULAR FILTRATION RATE: >60 ML/MIN/1.73M?

## 2023-11-28 PROCEDURE — 1125F AMNT PAIN NOTED PAIN PRSNT: CPT | Performed by: PHYSICIAN ASSISTANT

## 2023-11-28 PROCEDURE — 4004F PT TOBACCO SCREEN RCVD TLK: CPT | Performed by: PHYSICIAN ASSISTANT

## 2023-11-28 PROCEDURE — 1160F RVW MEDS BY RX/DR IN RCRD: CPT | Performed by: PHYSICIAN ASSISTANT

## 2023-11-28 PROCEDURE — 1159F MED LIST DOCD IN RCRD: CPT | Performed by: PHYSICIAN ASSISTANT

## 2023-11-28 PROCEDURE — 99213 OFFICE O/P EST LOW 20 MIN: CPT | Performed by: PHYSICIAN ASSISTANT

## 2023-11-28 NOTE — PROGRESS NOTES
Subjective   Patient ID: Miki Brown is a 68 y.o. male who presents for New Patient Visit and Hernia.    HPI  68-year-old gentleman who comes in because he believes he has a left inguinal hernia.  He went to his doctor and his doctor thinks he has 1 tube.  Patient states about 2 3 weeks ago he was sitting in his recliner and he had a horrible coughing episode and he felt a pop and has had pain in his left groin since but he does not feel any bulging.    Review of Systems  Negative other than mentioned in HPI    ENT: No earache, no sore throat, no nosebleeds  Cardiovascular: No chest pain, no shortness of breath, no leg pain, no edema  Respiratory: No shortness of breath on exertion, no wheezing  Gastrointestinal: No abdominal pain, no melena, no nausea, vomiting and/or   Left inguinal pain  Musculoskeletal: No pain moving all extremities, no back pain ambulating normally  Skin: No rashes, no lesions, and no skin changes  Neuro: No headache, no confusion, no numbness and tingling  Psychiatric, normal mood, not suicidal, not homicidal, feeling good          Physical Exam  Eyes: Conjunctiva non -icteric and eye lids are without obvious rash or drooping. Pupils are symmetric.   Ears, Nose, Mouth, and Throat: External ears and nose appear to be without deformity or rash. No lesions or masses noted. Hearing is grossly intact.   Neck:. No JVD noted, tracheal position is midline. No thyromegaly, no thyroid nodules  Head and Face: Examination of the head and face revealed no abnormalities.   Respiratory: No gasping or shortness of breath noted, no use of accessory muscles noted. Clear to auscultate bilaterally  Cardiovascular: Examination for edema is normal. Regular rate and rhythm S1 S2 without murmurs  GI: Abdomen non tender to palpation, bowel sounds present no hepatosplenomegaly   Inguinal:.  Patient has some pain in his left scrotum the left testicle is slightly elevated.  Unclear whether there is a indirect left  inguinal hernia.  And a possible right inguinal hernia is present as well.  Skin: No rashes or open lesions/ulcers identified on skin.   Musk: Digits/nails show no clubbing or cyanosis. No asymmetry or masses noted of the musculature. Examination of the muscles/joints/bones show normal range of motion. Gait is grossly normally.   Neurologic: Cranial nerves II- XII intact, motor strength 5/5 muscle strength of the lower extremities bilaterally and equal.      Objective     No diagnosis found.   Patient Active Problem List   Diagnosis    Accidental fall    Acute leg pain    Ankle pain    AR (allergic rhinitis)    Benign essential HTN    DDD (degenerative disc disease), lumbosacral    DDD (degenerative disc disease), thoracolumbar    Dyslipidemia    Gastroesophageal reflux disease    Left knee pain    Lumbar radicular pain    Male erectile disorder    Nocturia    Degenerative arthritis of thumb, left    Degenerative arthritis of thumb, right    Pain of finger of left hand    Secondary hypothyroidism    Skin eschar    Thoracic myofascial strain    Hip pain    Upper back pain on right side    Class 2 severe obesity with serious comorbidity and body mass index (BMI) of 37.0 to 37.9 in adult (CMS/[facility])    Flu-like symptoms    Sciatica of right side    Lymphadenopathy    Soft tissue mass      No Known Allergies   Medication Documentation Review Audit       Reviewed by Emilie Stock MA (Medical Assistant) on 11/28/23 at 0938      Medication Order Taking? Sig Documenting Provider Last Dose Status   albuterol 90 mcg/actuation inhaler [id number] Yes Inhale 2 puffs every 6 hours. As directed Nicole Platt MD Taking Active   ibuprofen 800 mg tablet [id number] Yes Take 1 tablet (800 mg) by mouth 3 times a day as needed for mild pain (1 - 3) (pain). Nicole Platt MD Taking Active   levothyroxine (Synthroid, Levoxyl) 50 mcg tablet [id number] Yes Take 1 tablet daily, on an empty stomach with only water, at least 1-2 hours before  eating, drinking, or taking other meds or supplements. Nicole Platt MD Taking Active   lisinopril 10 mg tablet [id number] Yes Take 1 tablet (10 mg) by mouth once daily. Nicole Platt MD Taking Active   orphenadrine (Norflex) 100 mg 12 hr tablet [id number] Yes Take 1 tablet (100 mg) by mouth 2 times a day as needed for muscle spasms (or back pain). Do not crush, chew, or split. RITIKA Yip Taking Active   predniSONE (Deltasone) 20 mg tablet [id number] Yes 3 tabs daily x3d then 2 tabs daily x 3d then 1 tab daily x 3d then 1/2 tab daily x 3d with food RITIKA Yip Taking Active                    Past Medical History:   Diagnosis Date    Atrophic disorder of skin, unspecified 12/10/2013    Atrophoderma    Balanoposthitis 12/10/2013    Balanoposthitis    Bradycardia, unspecified 08/19/2014    Bradycardia    Candidiasis, unspecified 12/10/2013    Candidiasis    Cellulitis, unspecified 12/10/2013    Cellulitis    Encounter for general adult medical examination without abnormal findings 12/10/2013    Physical exam, routine    Encounter for immunization 07/28/2020    Need for prophylactic vaccination and inoculation against influenza    Gastro-esophageal reflux disease without esophagitis 12/10/2013    Esophageal reflux    Low back pain, unspecified 12/10/2013    Lumbago    Pain in unspecified knee 12/10/2013    Joint pain, knee    Pathological fracture, other site, initial encounter for fracture 12/10/2013    Pathologic fracture of vertebrae    Personal history of other diseases of the respiratory system 12/23/2014    History of influenza    Tinea cruris 12/10/2013    Tinea cruris    Unspecified contact dermatitis due to plants, except food 12/10/2013    Contact dermatitis due to plants, except food     Social History     Tobacco Use   Smoking Status Every Day    Types: Pipe    Passive exposure: Never   Smokeless Tobacco Never     Family History   Problem Relation Name Age of Onset     Ovarian cancer Mother      Cancer Father        Past Surgical History:   Procedure Laterality Date    HAND SURGERY  01/31/2015    Hand Surgery                                                                                                                                                             Assessment/Plan   Possible left inguinal hernia and right inguinal hernia on exam today but difficult exam.  Patient will require CT of the abdomen and pelvis in order to assess these possible inguinal hernias bilaterally.  Patient should follow-up in the office once that is complete.    Encounter Diagnoses   Name Primary?    Right groin pain     Deep left inguinal pain Yes    Pain in scrotum      I have reviewed all data including labs,radiologic and previous reports.      **Portions of this medical record have been created using voice recognition software and may have minor errors which are inherent in voice recognition systems. It has not been fully edited for typographical or grammatical errors**

## 2023-12-04 ENCOUNTER — APPOINTMENT (OUTPATIENT)
Dept: SURGERY | Facility: CLINIC | Age: 68
End: 2023-12-04
Payer: COMMERCIAL

## 2023-12-14 PROBLEM — K40.90 NON-RECURRENT UNILATERAL INGUINAL HERNIA WITHOUT OBSTRUCTION OR GANGRENE: Status: ACTIVE | Noted: 2023-12-14

## 2023-12-15 NOTE — ASSESSMENT & PLAN NOTE
Inguinal hernia  Consult general surgery  Hold when you cough or sneeze  Followup after consultation

## 2024-01-31 DIAGNOSIS — E03.8 SECONDARY HYPOTHYROIDISM: ICD-10-CM

## 2024-02-01 RX ORDER — LEVOTHYROXINE SODIUM 50 UG/1
TABLET ORAL
Qty: 90 TABLET | Refills: 0 | Status: SHIPPED | OUTPATIENT
Start: 2024-02-01 | End: 2024-02-14 | Stop reason: SDUPTHER

## 2024-02-14 ENCOUNTER — OFFICE VISIT (OUTPATIENT)
Dept: PRIMARY CARE | Facility: CLINIC | Age: 69
End: 2024-02-14
Payer: COMMERCIAL

## 2024-02-14 VITALS
HEART RATE: 56 BPM | SYSTOLIC BLOOD PRESSURE: 130 MMHG | BODY MASS INDEX: 36.1 KG/M2 | TEMPERATURE: 97 F | OXYGEN SATURATION: 93 % | WEIGHT: 230 LBS | HEIGHT: 67 IN | DIASTOLIC BLOOD PRESSURE: 78 MMHG

## 2024-02-14 DIAGNOSIS — J44.9 CHRONIC OBSTRUCTIVE PULMONARY DISEASE, UNSPECIFIED COPD TYPE (MULTI): ICD-10-CM

## 2024-02-14 DIAGNOSIS — Z12.11 COLON CANCER SCREENING: ICD-10-CM

## 2024-02-14 DIAGNOSIS — E66.01 CLASS 2 SEVERE OBESITY WITH SERIOUS COMORBIDITY AND BODY MASS INDEX (BMI) OF 36.0 TO 36.9 IN ADULT, UNSPECIFIED OBESITY TYPE (MULTI): ICD-10-CM

## 2024-02-14 DIAGNOSIS — K40.90 NON-RECURRENT UNILATERAL INGUINAL HERNIA WITHOUT OBSTRUCTION OR GANGRENE: ICD-10-CM

## 2024-02-14 DIAGNOSIS — R10.31 GROIN PAIN, RIGHT: ICD-10-CM

## 2024-02-14 DIAGNOSIS — E03.8 SECONDARY HYPOTHYROIDISM: ICD-10-CM

## 2024-02-14 DIAGNOSIS — Z00.00 ANNUAL PHYSICAL EXAM: Primary | ICD-10-CM

## 2024-02-14 DIAGNOSIS — I10 BENIGN ESSENTIAL HTN: ICD-10-CM

## 2024-02-14 PROCEDURE — 3075F SYST BP GE 130 - 139MM HG: CPT | Performed by: FAMILY MEDICINE

## 2024-02-14 PROCEDURE — 99214 OFFICE O/P EST MOD 30 MIN: CPT | Performed by: FAMILY MEDICINE

## 2024-02-14 PROCEDURE — 3008F BODY MASS INDEX DOCD: CPT | Performed by: FAMILY MEDICINE

## 2024-02-14 PROCEDURE — 3078F DIAST BP <80 MM HG: CPT | Performed by: FAMILY MEDICINE

## 2024-02-14 PROCEDURE — 1159F MED LIST DOCD IN RCRD: CPT | Performed by: FAMILY MEDICINE

## 2024-02-14 PROCEDURE — 1160F RVW MEDS BY RX/DR IN RCRD: CPT | Performed by: FAMILY MEDICINE

## 2024-02-14 PROCEDURE — 1124F ACP DISCUSS-NO DSCNMKR DOCD: CPT | Performed by: FAMILY MEDICINE

## 2024-02-14 PROCEDURE — 1125F AMNT PAIN NOTED PAIN PRSNT: CPT | Performed by: FAMILY MEDICINE

## 2024-02-14 RX ORDER — PREDNISONE 20 MG/1
TABLET ORAL
Qty: 20 TABLET | Refills: 0 | Status: CANCELLED | OUTPATIENT
Start: 2024-02-14

## 2024-02-14 RX ORDER — LEVOTHYROXINE SODIUM 50 UG/1
TABLET ORAL
Qty: 90 TABLET | Refills: 3 | Status: SHIPPED | OUTPATIENT
Start: 2024-02-14

## 2024-02-14 RX ORDER — ORPHENADRINE CITRATE 100 MG/1
100 TABLET, EXTENDED RELEASE ORAL 2 TIMES DAILY PRN
Qty: 60 TABLET | Refills: 5 | Status: CANCELLED | OUTPATIENT
Start: 2024-02-14 | End: 2024-08-12

## 2024-02-14 RX ORDER — IBUPROFEN 800 MG/1
800 TABLET ORAL 3 TIMES DAILY PRN
Qty: 180 TABLET | Refills: 3 | Status: SHIPPED | OUTPATIENT
Start: 2024-02-14 | End: 2025-02-13

## 2024-02-14 RX ORDER — ALBUTEROL SULFATE 90 UG/1
2 AEROSOL, METERED RESPIRATORY (INHALATION) EVERY 6 HOURS
Qty: 18 G | Refills: 3 | Status: SHIPPED | OUTPATIENT
Start: 2024-02-14

## 2024-02-14 RX ORDER — LISINOPRIL 10 MG/1
10 TABLET ORAL DAILY
Qty: 90 TABLET | Refills: 3 | Status: SHIPPED | OUTPATIENT
Start: 2024-02-14

## 2024-02-14 NOTE — PROGRESS NOTES
Subjective   Patient ID: Miki Brown is a 68 y.o. male who presents for Follow-up (US results ordered by [facility] ? He newver heard back from them regarding results or what to do next, pt needs TSH last one 2018).  Very pleasant 68-year-old with history of hypertension hypothyroidism osteoarthritis here today with groin pain has hernia he saw specialist not sure what to do next  Follow-up hypothyroidism and longstanding hypothyroidism no skin or hair changes no proximal muscle weakness no heat or cold intolerance  Follow-up hypertension stable on lisinopril 10 mg has had for several years has family history of hypertension no angina palpitations or syncope no chronic cough  He works outside home is very active he is well no joint pain osteoarthritis manages with ibuprofen uses intermittently takes it with food has not had any abdominal issues  Mild reactive airway uses albuterol on as-needed basis would like to continue doing that    Recent exacerbation no tremors largely based on allergies and works well for him        Review of Systems  Constitutional: no chills, no fever and no night sweats.   Eyes: no blurred vision and no eyesight problems.   ENT: no hearing loss, no nasal congestion, no nasal discharge, no hoarseness and no sore throat.   Cardiovascular: no chest pain, no intermittent leg claudication, no lower extremity edema, no palpitations and no syncope.   Respiratory: no cough, no shortness of breath during exertion, no shortness of breath at rest and no wheezing.   Gastrointestinal: no abdominal pain, no blood in stools, no constipation, no diarrhea, no melena, no nausea, no rectal pain and no vomiting.   Genitourinary: no dysuria, no change in urinary frequency, no urinary hesitancy, no feelings of urinary urgency and no vaginal discharge.   Musculoskeletal: no arthralgias,  no back pain and no myalgias.   Integumentary: no new skin lesions and no rashes.   Neurological: no difficulty walking, no  "headache, no limb weakness, no numbness and no tingling.   Psychiatric: no anxiety, no depression, no anhedonia and no substance use disorders.   Endocrine: no recent weight gain and no recent weight loss.   Hematologic/Lymphatic: no tendency for easy bruising and no swollen glands .    Objective    /78   Pulse 56   Temp 36.1 °C (97 °F)   Ht 1.702 m (5' 7\")   Wt 104 kg (230 lb)   SpO2 93%   BMI 36.02 kg/m²    Physical Exam  The patient appeared well nourished and normally developed. Vital signs as documented. Head exam is unremarkable. No scleral icterus or corneal arcus noted.  Pupils are equal round reactive to light extraocular movements are intact no hemorrhages noted on funduscopic exam mouth mucous membranes are moist no exudates ears canals clear TMs are gray pearly not injected nose no rhinorrhea or epistaxis Neck is without jugular venous distension, thyromegaly, or carotid bruits. Carotid upstrokes are brisk bilaterally. Lungs are clear to auscultation and percussion. Cardiac exam reveals the PMI to be normally sized and situated. Rhythm is regular. First and second heart sounds normal. No murmurs, rubs or gallops. Abdominal exam reveals normal bowel sounds, no masses, no organomegaly and no aortic enlargement. Extremities are nonedematous and both femoral and pedal pulses are normal.  Neurologic exam DTRs are equal bilaterally no focal deficits strength is symmetrical heme lymph no palpable lymph nodes in the neck axilla or groin  Pain in groin no masses degenerative changes in the joints  Assessment/Plan   Problem List Items Addressed This Visit       Benign essential HTN    Relevant Medications    lisinopril 10 mg tablet    Secondary hypothyroidism    Relevant Medications    levothyroxine (Synthroid, Levoxyl) 50 mcg tablet    Class 2 severe obesity with serious comorbidity and body mass index (BMI) of 36.0 to 36.9 in adult (CMS/HCC)    Non-recurrent unilateral inguinal hernia without " obstruction or gangrene    Relevant Medications    ibuprofen 800 mg tablet    COPD (chronic obstructive pulmonary disease) (CMS/[facility])    Relevant Medications    albuterol 90 mcg/actuation inhaler    Groin pain, right     Other Visit Diagnoses       Annual physical exam    -  Primary    Relevant Orders    Comprehensive Metabolic Panel    TSH    Hemoglobin A1C    Lipid Panel    Colon cancer screening        Relevant Orders    Colonoscopy Screening; Average Risk Patient          Unremarkable physical exam  Annual wellness exam biometric screening  Hypertension stable on lisinopril continue current medication management  Groin pain Norflex prednisone physical therapy follow-up if no improvement try ibuprofen as well  Reactive airway disease continue albuterol refills given  Hypothyroidism check TSH continue Synthroid  Continue healthy diet and exercise and schedule annual wellness exam pain in groin no masses       Nicole Platt MD

## 2024-02-19 LAB
NON-UH HIE A/G RATIO: 1.2
NON-UH HIE ALB: 3.9 G/DL (ref 3.4–5)
NON-UH HIE ALK PHOS: 112 UNIT/L (ref 45–117)
NON-UH HIE BILIRUBIN, TOTAL: 0.6 MG/DL (ref 0.3–1.2)
NON-UH HIE BUN/CREAT RATIO: 15
NON-UH HIE BUN: 18 MG/DL (ref 9–23)
NON-UH HIE CALCIUM: 9.4 MG/DL (ref 8.7–10.4)
NON-UH HIE CALCULATED LDL CHOLESTEROL: 120 MG/DL (ref 60–130)
NON-UH HIE CALCULATED OSMOLALITY: 283 MOSM/KG (ref 275–295)
NON-UH HIE CHLORIDE: 104 MMOL/L (ref 98–107)
NON-UH HIE CHOLESTEROL: 179 MG/DL (ref 100–200)
NON-UH HIE CO2, VENOUS: 29 MMOL/L (ref 20–31)
NON-UH HIE CREATININE: 1.2 MG/DL (ref 0.6–1.1)
NON-UH HIE GFR AA: >60
NON-UH HIE GLOBULIN: 3.3 G/DL
NON-UH HIE GLOMERULAR FILTRATION RATE: >60 ML/MIN/1.73M?
NON-UH HIE GLUCOSE: 92 MG/DL (ref 74–106)
NON-UH HIE GOT: 26 UNIT/L (ref 15–37)
NON-UH HIE GPT: 23 UNIT/L (ref 10–49)
NON-UH HIE HDL CHOLESTEROL: 39 MG/DL (ref 40–60)
NON-UH HIE HGB A1C: 5.5 %
NON-UH HIE K: 4.5 MMOL/L (ref 3.5–5.1)
NON-UH HIE NA: 141 MMOL/L (ref 135–145)
NON-UH HIE TOTAL CHOL/HDL CHOL RATIO: 4.6
NON-UH HIE TOTAL PROTEIN: 7.2 G/DL (ref 5.7–8.2)
NON-UH HIE TRIGLYCERIDES: 100 MG/DL (ref 30–150)
NON-UH HIE TSH: 0.88 UIU/ML (ref 0.55–4.78)

## 2024-03-01 PROBLEM — R10.31 GROIN PAIN, RIGHT: Status: ACTIVE | Noted: 2024-03-01

## 2024-03-13 ENCOUNTER — OFFICE VISIT (OUTPATIENT)
Dept: SURGERY | Facility: CLINIC | Age: 69
End: 2024-03-13
Payer: COMMERCIAL

## 2024-03-13 DIAGNOSIS — K40.20 NON-RECURRENT BILATERAL INGUINAL HERNIA WITHOUT OBSTRUCTION OR GANGRENE: Primary | ICD-10-CM

## 2024-03-13 PROCEDURE — 99214 OFFICE O/P EST MOD 30 MIN: CPT | Performed by: PHYSICIAN ASSISTANT

## 2024-03-13 PROCEDURE — 1125F AMNT PAIN NOTED PAIN PRSNT: CPT | Performed by: PHYSICIAN ASSISTANT

## 2024-03-13 PROCEDURE — 1160F RVW MEDS BY RX/DR IN RCRD: CPT | Performed by: PHYSICIAN ASSISTANT

## 2024-03-13 PROCEDURE — 1159F MED LIST DOCD IN RCRD: CPT | Performed by: PHYSICIAN ASSISTANT

## 2024-03-13 PROCEDURE — 3008F BODY MASS INDEX DOCD: CPT | Performed by: PHYSICIAN ASSISTANT

## 2024-03-13 NOTE — PROGRESS NOTES
Subjective   Patient ID: Miki Brown is a 68 y.o. male who presents for Hernia (Bilateral inguinal hernia's).    HPI  68-year-old gentleman with bilateral inguinal pain seen here last November.  CT did show bilateral inguinal hernias are present.  Patient is here today because he would like to schedule surgery.  He continues to have left greater than right inguinal pain.    Review of Systems  Negative other than mentioned in HPI    ENT: No earache, no sore throat, no nosebleeds  Cardiovascular: No chest pain, no shortness of breath, no leg pain, no edema  Respiratory: No shortness of breath on exertion, no wheezing  Gastrointestinal: No abdominal pain, no melena, no nausea, vomiting and/or diarrhea  Musculoskeletal: No pain moving all extremities, no back pain ambulating normally  Skin: No rashes, no lesions, and no skin changes  Neuro: No headache, no confusion, no numbness and tingling  Psychiatric, normal mood, not suicidal, not homicidal, feeling good          Physical Exam   Eyes: Conjunctiva non -icteric and eye lids are without obvious rash or drooping. Pupils are symmetric.   Ears, Nose, Mouth, and Throat: External ears and nose appear to be without deformity or rash. No lesions or masses noted. Hearing is grossly intact.   Neck:. No JVD noted, tracheal position is midline. No thyromegaly, no thyroid nodules  Head and Face: Examination of the head and face revealed no abnormalities.   Respiratory: No gasping or shortness of breath noted, no use of accessory muscles noted. Clear to auscultate bilaterally  Cardiovascular: Examination for edema is normal. Regular rate and rhythm, no murmurs   GI: Abdomen non tender to palpation, bowel sounds present no hepatosplenomegaly  Bilateral inguinal hernias are present.  Left greater than right  Skin: No rashes or open lesions/ulcers identified on skin.   Musk: Digits/nails show no clubbing or cyanosis. No asymmetry or masses noted of the musculature. Examination of  the muscles/joints/bones show normal range of motion. Gait is grossly normally.   Neurologic: Cranial nerves II- XII intact, motor strength 5/5 muscle strength of the lower extremities bilaterally and equal.      Objective     No diagnosis found.   Patient Active Problem List   Diagnosis    Accidental fall    Acute leg pain    Ankle pain    AR (allergic rhinitis)    Benign essential HTN    DDD (degenerative disc disease), lumbosacral    DDD (degenerative disc disease), thoracolumbar    Dyslipidemia    Gastroesophageal reflux disease    Left knee pain    Lumbar radicular pain    Male erectile disorder    Nocturia    Degenerative arthritis of thumb, left    Degenerative arthritis of thumb, right    Pain of finger of left hand    Secondary hypothyroidism    Skin eschar    Thoracic myofascial strain    Hip pain    Upper back pain on right side    Class 2 severe obesity with serious comorbidity and body mass index (BMI) of 36.0 to 36.9 in adult (CMS/[facility])    Flu-like symptoms    Sciatica of right side    Lymphadenopathy    Soft tissue mass    Non-recurrent unilateral inguinal hernia without obstruction or gangrene    COPD (chronic obstructive pulmonary disease) (CMS/[facility])    Groin pain, right      No Known Allergies   Medication Documentation Review Audit       Reviewed by Nicole Platt MD (Physician) on 03/01/24 at 0311      Medication Order Taking? Sig Documenting Provider Last Dose Status   albuterol 90 mcg/actuation inhaler [id number]  Inhale 2 puffs every 6 hours. As directed Nicole Platt MD  Active   ibuprofen 800 mg tablet [id number]  Take 1 tablet (800 mg) by mouth 3 times a day as needed for mild pain (1 - 3) (pain). Nicole Platt MD  Active   levothyroxine (Synthroid, Levoxyl) 50 mcg tablet [id number]  Take 1 tablet daily, on an empty stomach with only water, at least 1-2 hours before eating, drinking, or taking other meds or supplements. Nicole Platt MD  Active   lisinopril 10 mg tablet [id number]   Take 1 tablet (10 mg) by mouth once daily. Nicole Platt MD  Active   orphenadrine (Norflex) 100 mg 12 hr tablet [id number] No Take 1 tablet (100 mg) by mouth 2 times a day as needed for muscle spasms (or back pain). Do not crush, chew, or split. Charla AMBIKA White-CNP Taking Active   predniSONE (Deltasone) 20 mg tablet [id number] No 3 tabs daily x3d then 2 tabs daily x 3d then 1 tab daily x 3d then 1/2 tab daily x 3d with food Charla AMBIKA White-CNP Taking Active                    Past Medical History:   Diagnosis Date    Atrophic disorder of skin, unspecified 12/10/2013    Atrophoderma    Balanoposthitis 12/10/2013    Balanoposthitis    Bradycardia, unspecified 08/19/2014    Bradycardia    Candidiasis, unspecified 12/10/2013    Candidiasis    Cellulitis, unspecified 12/10/2013    Cellulitis    Encounter for general adult medical examination without abnormal findings 12/10/2013    Physical exam, routine    Encounter for immunization 07/28/2020    Need for prophylactic vaccination and inoculation against influenza    Gastro-esophageal reflux disease without esophagitis 12/10/2013    Esophageal reflux    Low back pain, unspecified 12/10/2013    Lumbago    Pain in unspecified knee 12/10/2013    Joint pain, knee    Pathological fracture, other site, initial encounter for fracture 12/10/2013    Pathologic fracture of vertebrae    Personal history of other diseases of the respiratory system 12/23/2014    History of influenza    Tinea cruris 12/10/2013    Tinea cruris    Unspecified contact dermatitis due to plants, except food 12/10/2013    Contact dermatitis due to plants, except food     Social History     Tobacco Use   Smoking Status Every Day    Types: Pipe    Passive exposure: Never   Smokeless Tobacco Never     Family History   Problem Relation Name Age of Onset    Ovarian cancer Mother      Cancer Father        Past Surgical History:   Procedure Laterality Date    HAND SURGERY  01/31/2015    Hand  Surgery                                                                                                                                                             Assessment/Plan   Today we had a discussion about robotic assisted bilateral  inguinal hernia repair with mesh.  Patient was informed that this is an outpatient surgery.  The surgery takes 1 to 1-1/2 hours.  There will be 3 small incisions or possible opened ,  requires a general anesthesia.  Patient will need a ride to and from the hospital.  Risk and benefits such as bleeding and infection were discussed as well.  Surgeries were described in detail.  Patient had complete understanding.  All questions were answered.  Patient would like to proceed    Encounter Diagnosis   Name Primary?    Non-recurrent bilateral inguinal hernia without obstruction or gangrene Yes     I have reviewed all data including labs,radiologic and previous reports.      Any extra mattresses right now but recognition software and may have minor errors which are inherent in voice recognition systems. It has not been fully edited for typographical or grammatical errors**

## 2024-04-05 ENCOUNTER — OFFICE VISIT (OUTPATIENT)
Dept: SURGERY | Facility: CLINIC | Age: 69
End: 2024-04-05
Payer: COMMERCIAL

## 2024-04-05 DIAGNOSIS — Z09 STATUS POST BILATERAL INGUINAL HERNIA REPAIR, FOLLOW-UP EXAM: Primary | ICD-10-CM

## 2024-04-05 PROCEDURE — 1160F RVW MEDS BY RX/DR IN RCRD: CPT | Performed by: PHYSICIAN ASSISTANT

## 2024-04-05 PROCEDURE — 3008F BODY MASS INDEX DOCD: CPT | Performed by: PHYSICIAN ASSISTANT

## 2024-04-05 PROCEDURE — 1159F MED LIST DOCD IN RCRD: CPT | Performed by: PHYSICIAN ASSISTANT

## 2024-04-05 PROCEDURE — 99024 POSTOP FOLLOW-UP VISIT: CPT | Performed by: PHYSICIAN ASSISTANT

## 2024-04-05 NOTE — LETTER
April 5, 2024     Patient: Miki Brown   YOB: 1955   Date of Visit: 4/5/2024       To Whom It May Concern:    It is my medical opinion that Miki Brown may return to work on 04/09/24 with no restrictions .    If you have any questions or concerns, please don't hesitate to call.         Sincerely,        Dipika Henry PA-C

## 2024-04-05 NOTE — PROGRESS NOTES
Subjective   Patient ID: Miki Brown is a 68 y.o. male who presents for Post-op (Postop hernia 3/22/2024).    HPI  68-year-old gentleman 2 weeks status post bilateral robotic inguinal hernia repair with mesh.  Patient is doing well he has no complaints.  No abdominal pain, no fever, no nausea, no vomiting, no diarrhea moving his bowels every day eating well.  Patient is actually requesting to go back to work on April 9.    Review of Systems  Review of systems is negative other than what is mentioned above    Physical Exam  Eyes: Conjunctiva non -icteric and eye lids are without obvious rash or drooping. Pupils are symmetric.   Ears, Nose, Mouth, and Throat: External ears and nose appear to be without deformity or rash. No lesions or masses noted. Hearing is grossly intact.   Neck:. No JVD noted, tracheal position is midline. No thyromegaly, no thyroid nodules  Head and Face: Examination of the head and face revealed no abnormalities.   Respiratory: No gasping or shortness of breath noted, no use of accessory muscles noted.   Cardiovascular: Examination for edema is normal  GI: Abdomen non tender to palpation, incisions are clean dry and intact no erythema no swelling no drainage  Skin: No rashes or open lesions/ulcers identified on skin.   Musk: Digits/nails show no clubbing or cyanosis. No asymmetry or masses noted of the musculature. Examination of the muscles/joints/bones show normal range of motion. Gait is grossly normally.   Neurologic: Cranial nerves II- XII intact, motor strength 5/5 muscle strength of the lower extremities bilaterally and equal.      Objective     No diagnosis found.   Patient Active Problem List   Diagnosis    Accidental fall    Acute leg pain    Ankle pain    AR (allergic rhinitis)    Benign essential HTN    DDD (degenerative disc disease), lumbosacral    DDD (degenerative disc disease), thoracolumbar    Dyslipidemia    Gastroesophageal reflux disease    Left knee pain    Lumbar  radicular pain    Male erectile disorder    Nocturia    Degenerative arthritis of thumb, left    Degenerative arthritis of thumb, right    Pain of finger of left hand    Secondary hypothyroidism    Skin eschar    Thoracic myofascial strain    Hip pain    Upper back pain on right side    Class 2 severe obesity with serious comorbidity and body mass index (BMI) of 36.0 to 36.9 in adult (CMS/[facility]ly [facility])    Flu-like symptoms    Sciatica of right side    Lymphadenopathy    Soft tissue mass    Non-recurrent unilateral inguinal hernia without obstruction or gangrene    COPD (chronic obstructive pulmonary disease) (CMS/[facility])    Groin pain, right      No Known Allergies   Medication Documentation Review Audit       Reviewed by Be Stout MA (Medical Assistant) on 24 at 0911      Medication Order Taking? Sig Documenting Provider Last Dose Status   albuterol 90 mcg/actuation inhaler [id number] No Inhale 2 puffs every 6 hours. As directed Nicole Platt MD Taking Active   ibuprofen 800 mg tablet [id number] No Take 1 tablet (800 mg) by mouth 3 times a day as needed for mild pain (1 - 3) (pain). Nicole Platt MD Taking Active   levothyroxine (Synthroid, Levoxyl) 50 mcg tablet [id number] No Take 1 tablet daily, on an empty stomach with only water, at least 1-2 hours before eating, drinking, or taking other meds or supplements. Nicole Platt MD Taking Active   lisinopril 10 mg tablet [id number] No Take 1 tablet (10 mg) by mouth once daily. Nicole Platt MD Taking Active   orphenadrine (Norflex) 100 mg 12 hr tablet [id number] No Take 1 tablet (100 mg) by mouth 2 times a day as needed for muscle spasms (or back pain). Do not crush, chew, or split. 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/2013    Balanoposthitis    Bradycardia, unspecified 08/19/2014    Bradycardia    Candidiasis, unspecified 12/10/2013    Candidiasis    Cellulitis, unspecified 12/10/2013    Cellulitis    Encounter for general adult medical examination without abnormal findings 12/10/2013    Physical exam, routine    Encounter for immunization 07/28/2020    Need for prophylactic vaccination and inoculation against influenza    Gastro-esophageal reflux disease without esophagitis 12/10/2013    Esophageal reflux    Low back pain, unspecified 12/10/2013    Lumbago    Pain in unspecified knee 12/10/2013    Joint pain, knee    Pathological fracture, other site, initial encounter for fracture 12/10/2013    Pathologic fracture of vertebrae    Personal history of other diseases of the respiratory system 12/23/2014    History of influenza    Tinea cruris 12/10/2013    Tinea cruris    Unspecified contact dermatitis due to plants, except food 12/10/2013    Contact dermatitis due to plants, except food     Social History     Tobacco Use   Smoking Status Every Day    Types: Pipe    Passive exposure: Never   Smokeless Tobacco Never     Family History   Problem Relation Name Age of Onset    Ovarian cancer Mother      Cancer Father        Past Surgical History:   Procedure Laterality Date    HAND SURGERY  01/31/2015    Hand Surgery                                                                                                                                                             Assessment/Plan     No lifting over 10 to 12 pounds for 4 weeks  No swimming pools hot tubs or lakes for 2 weeks  You may ride a stationary bike, you may use a treadmill, you may walk outside.  No squats, sit ups or lunges  You may drive a car  Follow-up as needed    Encounter Diagnosis   Name Primary?    Status post bilateral inguinal hernia repair,  follow-up exam Yes     I have reviewed all data including labs,radiologic and previous reports.   **Portions of this medical record have been created using voice recognition software and may have minor errors which we are inherent in voice recognition systems it has not been fully edited for typographical or grammatical errors**      Dipika Henry PA-C

## 2024-05-28 ENCOUNTER — OFFICE VISIT (OUTPATIENT)
Dept: PRIMARY CARE | Facility: CLINIC | Age: 69
End: 2024-05-28
Payer: COMMERCIAL

## 2024-05-28 VITALS
BODY MASS INDEX: 36.32 KG/M2 | HEART RATE: 79 BPM | HEIGHT: 66 IN | WEIGHT: 226 LBS | TEMPERATURE: 97.5 F | SYSTOLIC BLOOD PRESSURE: 129 MMHG | DIASTOLIC BLOOD PRESSURE: 84 MMHG | OXYGEN SATURATION: 95 %

## 2024-05-28 DIAGNOSIS — R55 VASOVAGAL SYNCOPE: ICD-10-CM

## 2024-05-28 DIAGNOSIS — E86.0 DEHYDRATION: ICD-10-CM

## 2024-05-28 DIAGNOSIS — Z09 HOSPITAL DISCHARGE FOLLOW-UP: Primary | ICD-10-CM
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
## 2024-05-28 PROBLEM — M25.579 ANKLE PAIN: Status: RESOLVED | Noted: 2023-03-21 | Resolved: 2024-05-28

## 2024-05-28 PROBLEM — M25.562 LEFT KNEE PAIN: Status: RESOLVED | Noted: 2023-03-21 | Resolved: 2024-05-28

## 2024-05-28 PROCEDURE — 1159F MED LIST DOCD IN RCRD: CPT | Performed by: FAMILY MEDICINE

## 2024-05-28 PROCEDURE — 99214 OFFICE O/P EST MOD 30 MIN: CPT | Performed by: FAMILY MEDICINE

## 2024-05-28 PROCEDURE — 4004F PT TOBACCO SCREEN RCVD TLK: CPT | Performed by: FAMILY MEDICINE

## 2024-05-28 PROCEDURE — 3079F DIAST BP 80-89 MM HG: CPT | Performed by: FAMILY MEDICINE

## 2024-05-28 PROCEDURE — 1124F ACP DISCUSS-NO DSCNMKR DOCD: CPT | Performed by: FAMILY MEDICINE

## 2024-05-28 PROCEDURE — 3074F SYST BP LT 130 MM HG: CPT | Performed by: FAMILY MEDICINE

## 2024-05-28 PROCEDURE — 1160F RVW MEDS BY RX/DR IN RCRD: CPT | Performed by: FAMILY MEDICINE

## 2024-05-28 PROCEDURE — 3008F BODY MASS INDEX DOCD: CPT | Performed by: FAMILY MEDICINE

## 2024-05-28 ASSESSMENT — PATIENT HEALTH QUESTIONNAIRE - PHQ9
2. FEELING DOWN, DEPRESSED OR HOPELESS: NOT AT ALL
1. LITTLE INTEREST OR PLEASURE IN DOING THINGS: NOT AT ALL
SUM OF ALL RESPONSES TO PHQ9 QUESTIONS 1 AND 2: 0

## 2024-05-28 NOTE — PROGRESS NOTES
Subjective   Patient ID: Miki Brown is a 68 y.o. male who presents for ER Follow-up (Pt was in ER for dehydration while doing colonoscopy prep ).  Very pleasant 68-year-old here today for  hospital follow-up he went in for his colonoscopy with doing the colon prep felt lightheaded dizzy and felt like he was going to pass out he was very weak and could barely move and passed out went to emergency room was diagnosed with acute dehydration was hydrated and felt better was observed and then discharged home he feels better his symptoms have resolved but he has no wish to have a colonoscopy again because he cannot manage the prep he has a history of polyps and cannot have Cologuard         Review of Systems  Constitutional: no chills, no fever and no night sweats.   Eyes: no blurred vision and no eyesight problems.   ENT: no hearing loss, no nasal congestion, no nasal discharge, no hoarseness and no sore throat.   Cardiovascular: no chest pain, no intermittent leg claudication, no lower extremity edema, no palpitations and no syncope.   Respiratory: no cough, no shortness of breath during exertion, no shortness of breath at rest and no wheezing.   Gastrointestinal: no abdominal pain, no blood in stools, no constipation, no diarrhea, no melena, no nausea, no rectal pain and no vomiting.   Genitourinary: no dysuria, no change in urinary frequency, no urinary hesitancy, no feelings of urinary urgency and no vaginal discharge.   Musculoskeletal: no arthralgias,  no back pain and no myalgias.   Integumentary: no new skin lesions and no rashes.   Neurological: no difficulty walking, no headache, no limb weakness, no numbness and no tingling.   Psychiatric: no anxiety, no depression, no anhedonia and no substance use disorders.   Endocrine: no recent weight gain and no recent weight loss.   Hematologic/Lymphatic: no tendency for easy bruising and no swollen glands .    Objective    /84   Pulse 79   Temp 36.4 °C  "(97.5 °F)   Ht 1.676 m (5' 6\")   Wt 103 kg (226 lb)   SpO2 95%   BMI 36.48 kg/m²    Physical Exam  The patient appeared well nourished and normally developed. Vital signs as documented. Head exam is unremarkable. No scleral icterus or corneal arcus noted.  Pupils are equal round reactive to light extraocular movements are intact no hemorrhages noted on funduscopic exam mouth mucous membranes are moist no exudates ears canals clear TMs are gray pearly not injected nose no rhinorrhea or epistaxis Neck is without jugular venous distension, thyromegaly, or carotid bruits. Carotid upstrokes are brisk bilaterally. Lungs are clear to auscultation and percussion. Cardiac exam reveals the PMI to be normally sized and situated. Rhythm is regular. First and second heart sounds normal. No murmurs, rubs or gallops. Abdominal exam reveals normal bowel sounds, no masses, no organomegaly and no aortic enlargement. Extremities are nonedematous and both femoral and pedal pulses are normal.  Neurologic exam DTRs are equal bilaterally no focal deficits strength is symmetrical heme lymph no palpable lymph nodes in the neck axilla or groin    Assessment/Plan   Dehydration secondary to colon cancer screening preparation for colonoscopy causing syncope  Symptoms resolved with hydration and rest  Cannot tolerate the preparation  Symptoms resolved  Hospital discharge for dehydration  Schedule annual wellness exam          Nicole Platt MD  "

## 2024-06-16 PROBLEM — Z09 HOSPITAL DISCHARGE FOLLOW-UP: Status: ACTIVE | Noted: 2024-06-16

## 2024-06-16 PROBLEM — R55 VASOVAGAL SYNCOPE: Status: ACTIVE | Noted: 2024-06-16

## 2024-06-16 PROBLEM — E86.0 DEHYDRATION: Status: ACTIVE | Noted: 2024-06-16

## 2024-07-09 ENCOUNTER — APPOINTMENT (OUTPATIENT)
Dept: PRIMARY CARE | Facility: CLINIC | Age: 69
End: 2024-07-09
Payer: COMMERCIAL

## 2024-07-15 ENCOUNTER — APPOINTMENT (OUTPATIENT)
Dept: PRIMARY CARE | Facility: CLINIC | Age: 69
End: 2024-07-15
Payer: COMMERCIAL

## 2024-07-23 ENCOUNTER — APPOINTMENT (OUTPATIENT)
Dept: PRIMARY CARE | Facility: CLINIC | Age: 69
End: 2024-07-23
Payer: COMMERCIAL

## 2024-07-23 VITALS
DIASTOLIC BLOOD PRESSURE: 84 MMHG | BODY MASS INDEX: 37.12 KG/M2 | OXYGEN SATURATION: 95 % | WEIGHT: 230 LBS | TEMPERATURE: 97.5 F | HEART RATE: 63 BPM | SYSTOLIC BLOOD PRESSURE: 137 MMHG

## 2024-07-23 DIAGNOSIS — E66.01 CLASS 2 SEVERE OBESITY WITH SERIOUS COMORBIDITY AND BODY MASS INDEX (BMI) OF 37.0 TO 37.9 IN ADULT, UNSPECIFIED OBESITY TYPE (MULTI): ICD-10-CM

## 2024-07-23 DIAGNOSIS — R94.31 ABNORMAL ECG: ICD-10-CM

## 2024-07-23 DIAGNOSIS — Z00.00 ANNUAL PHYSICAL EXAM: Primary | ICD-10-CM

## 2024-07-23 DIAGNOSIS — R00.2 PALPITATIONS: ICD-10-CM

## 2024-07-23 PROBLEM — E86.0 DEHYDRATION: Status: RESOLVED | Noted: 2024-06-16 | Resolved: 2024-07-23

## 2024-07-23 PROBLEM — R68.89 FLU-LIKE SYMPTOMS: Status: RESOLVED | Noted: 2023-03-21 | Resolved: 2024-07-23

## 2024-07-23 PROBLEM — E78.5 DYSLIPIDEMIA: Status: RESOLVED | Noted: 2023-03-21 | Resolved: 2024-07-23

## 2024-07-23 PROBLEM — E78.2 MIXED HYPERLIPIDEMIA: Status: ACTIVE | Noted: 2024-07-23

## 2024-07-23 PROCEDURE — 99397 PER PM REEVAL EST PAT 65+ YR: CPT | Performed by: FAMILY MEDICINE

## 2024-07-23 PROCEDURE — 1124F ACP DISCUSS-NO DSCNMKR DOCD: CPT | Performed by: FAMILY MEDICINE

## 2024-07-23 PROCEDURE — 90471 IMMUNIZATION ADMIN: CPT | Performed by: FAMILY MEDICINE

## 2024-07-23 PROCEDURE — 1160F RVW MEDS BY RX/DR IN RCRD: CPT | Performed by: FAMILY MEDICINE

## 2024-07-23 PROCEDURE — 4004F PT TOBACCO SCREEN RCVD TLK: CPT | Performed by: FAMILY MEDICINE

## 2024-07-23 PROCEDURE — 1159F MED LIST DOCD IN RCRD: CPT | Performed by: FAMILY MEDICINE

## 2024-07-23 PROCEDURE — 90677 PCV20 VACCINE IM: CPT | Performed by: FAMILY MEDICINE

## 2024-07-23 PROCEDURE — 3075F SYST BP GE 130 - 139MM HG: CPT | Performed by: FAMILY MEDICINE

## 2024-07-23 PROCEDURE — 3079F DIAST BP 80-89 MM HG: CPT | Performed by: FAMILY MEDICINE

## 2024-07-23 PROCEDURE — 93000 ELECTROCARDIOGRAM COMPLETE: CPT | Performed by: FAMILY MEDICINE

## 2024-07-23 ASSESSMENT — PROMIS GLOBAL HEALTH SCALE
CARRYOUT_PHYSICAL_ACTIVITIES: COMPLETELY
RATE_PHYSICAL_HEALTH: VERY GOOD
RATE_SOCIAL_SATISFACTION: VERY GOOD
CARRYOUT_SOCIAL_ACTIVITIES: VERY GOOD
EMOTIONAL_PROBLEMS: NEVER
RATE_AVERAGE_PAIN: 3
RATE_MENTAL_HEALTH: VERY GOOD
RATE_GENERAL_HEALTH: VERY GOOD
RATE_AVERAGE_FATIGUE: MODERATE
RATE_QUALITY_OF_LIFE: VERY GOOD

## 2024-07-23 ASSESSMENT — PATIENT HEALTH QUESTIONNAIRE - PHQ9
1. LITTLE INTEREST OR PLEASURE IN DOING THINGS: NOT AT ALL
2. FEELING DOWN, DEPRESSED OR HOPELESS: NOT AT ALL
SUM OF ALL RESPONSES TO PHQ9 QUESTIONS 1 AND 2: 0

## 2024-07-23 NOTE — ASSESSMENT & PLAN NOTE
Palpitations noted on today's exam and noted on EKG  Patient reports drink some strong coffee this morning

## 2024-07-23 NOTE — ASSESSMENT & PLAN NOTE
Unremarkable physical exam except for occasional PVCs  Above normal BMI nutrition and physical activity reviewed weight watchers or Noom recommended  Recommend RSV  Aortic aneurysm screening  Continue annual exams

## 2024-07-23 NOTE — PROGRESS NOTES
Subjective   Patient ID: Miki Brown is a 68 y.o. male who presents for Annual Exam.  Very pleasant 68-year-old here today for annual wellness exam  He has no complaints and feels well still works outside the home  Blood work was done in March  Blood pressure has been stable he smokes a pipe on occasion  No angina palpitations or syncope but he does report that he did drink a lot of coffee this morning  There were palpitations noted on his exam today and patient noted he is not feeling them but he states that he did drink a lot of coffee this morning        Review of Systems  Constitutional: no chills, no fever and no night sweats.   Eyes: no blurred vision and no eyesight problems.   ENT: no hearing loss, no nasal congestion, no nasal discharge, no hoarseness and no sore throat.   Cardiovascular: no chest pain, no intermittent leg claudication, no lower extremity edema, no palpitations and no syncope.   Respiratory: no cough, no shortness of breath during exertion, no shortness of breath at rest and no wheezing.   Gastrointestinal: no abdominal pain, no blood in stools, no constipation, no diarrhea, no melena, no nausea, no rectal pain and no vomiting.   Genitourinary: no dysuria, no change in urinary frequency, no urinary hesitancy, no feelings of urinary urgency and no vaginal discharge.   Musculoskeletal: no arthralgias,  no back pain and no myalgias.   Integumentary: no new skin lesions and no rashes.   Neurological: no difficulty walking, no headache, no limb weakness, no numbness and no tingling.   Psychiatric: no anxiety, no depression, no anhedonia and no substance use disorders.   Endocrine: no recent weight gain and no recent weight loss.   Hematologic/Lymphatic: no tendency for easy bruising and no swollen glands .    Objective    /84   Pulse 63   Temp 36.4 °C (97.5 °F)   Wt 104 kg (230 lb)   SpO2 95%   BMI 37.12 kg/m²    Physical Exam  The patient appeared well nourished and normally  developed. Vital signs as documented. Head exam is unremarkable. No scleral icterus or corneal arcus noted.  Pupils are equal round reactive to light extraocular movements are intact no hemorrhages noted on funduscopic exam mouth mucous membranes are moist no exudates ears canals clear TMs are gray pearly not injected nose no rhinorrhea or epistaxis Neck is without jugular venous distension, thyromegaly, or carotid bruits. Carotid upstrokes are brisk bilaterally. Lungs are clear to auscultation and percussion. Cardiac exam reveals the PMI to be normally sized and situated. Rhythm is regular. First and second heart sounds normal. No murmurs, rubs or gallops. Abdominal exam reveals normal bowel sounds, no masses, no organomegaly and no aortic enlargement. Extremities are nonedematous and both femoral and pedal pulses are normal.  Neurologic exam DTRs are equal bilaterally no focal deficits strength is symmetrical heme lymph no palpable lymph nodes in the neck axilla or groin    Assessment/Plan   Problem List Items Addressed This Visit       Class 2 severe obesity with serious comorbidity and body mass index (BMI) of 37.0 to 37.9 in adult (Multi)     Above normal BMI nutrition and physical activity reviewed         Palpitations     Palpitations noted on today's exam and noted on EKG  Patient reports drink some strong coffee this morning         Relevant Orders    ECG 12 Lead (Completed)    Annual physical exam - Primary     Unremarkable physical exam except for occasional PVCs  Above normal BMI nutrition and physical activity reviewed weight watchers or Noom recommended  Recommend RSV  Aortic aneurysm screening  Continue annual exams         Abnormal ECG     Abnormal ECG  PVCs LVH nonspecific changes  Ordering stress echo for evaluation         Relevant Orders    Echocardiogram Stress Test            Nicole Platt MD

## 2024-07-28 PROBLEM — M84.48XA PATHOLOGICAL FRACTURE OF VERTEBRA: Status: RESOLVED | Noted: 2024-07-28 | Resolved: 2024-07-28

## 2024-07-28 PROBLEM — N47.6 BALANOPOSTHITIS: Status: RESOLVED | Noted: 2024-07-28 | Resolved: 2024-07-28

## 2024-07-28 PROBLEM — D17.9 LIPOMA: Status: RESOLVED | Noted: 2024-07-28 | Resolved: 2024-07-28

## 2024-07-28 PROBLEM — M19.90 ARTHRITIS: Status: RESOLVED | Noted: 2024-07-28 | Resolved: 2024-07-28

## 2024-07-28 PROBLEM — L03.90 CELLULITIS: Status: RESOLVED | Noted: 2024-07-28 | Resolved: 2024-07-28

## 2024-07-28 PROBLEM — R25.2 LEG CRAMPS: Status: RESOLVED | Noted: 2024-05-21 | Resolved: 2024-07-28

## 2024-07-28 PROBLEM — R22.1 MASS OF NECK: Status: RESOLVED | Noted: 2024-07-28 | Resolved: 2024-07-28

## 2024-07-28 PROBLEM — L90.9 ATROPHY OF SKIN: Status: RESOLVED | Noted: 2024-07-28 | Resolved: 2024-07-28

## 2024-07-28 PROBLEM — Z86.19 HISTORY OF VIRAL INFECTION: Status: RESOLVED | Noted: 2024-07-28 | Resolved: 2024-07-28

## 2024-07-28 PROBLEM — R00.1 BRADYCARDIA: Status: RESOLVED | Noted: 2024-07-28 | Resolved: 2024-07-28

## 2024-07-28 PROBLEM — L25.5 CONTACT DERMATITIS DUE TO PLANT: Status: RESOLVED | Noted: 2024-07-28 | Resolved: 2024-07-28

## 2024-07-28 PROBLEM — B37.9 CANDIDIASIS: Status: RESOLVED | Noted: 2024-07-28 | Resolved: 2024-07-28

## 2024-08-13 ENCOUNTER — HOSPITAL ENCOUNTER (OUTPATIENT)
Dept: CARDIOLOGY | Facility: HOSPITAL | Age: 69
Discharge: HOME | End: 2024-08-13
Payer: COMMERCIAL

## 2024-08-13 DIAGNOSIS — R94.31 ABNORMAL ECG: ICD-10-CM

## 2024-08-13 PROCEDURE — 93017 CV STRESS TEST TRACING ONLY: CPT

## 2024-08-13 PROCEDURE — 93016 CV STRESS TEST SUPVJ ONLY: CPT | Performed by: INTERNAL MEDICINE

## 2024-08-13 PROCEDURE — 93350 STRESS TTE ONLY: CPT | Performed by: INTERNAL MEDICINE

## 2024-08-13 PROCEDURE — 93018 CV STRESS TEST I&R ONLY: CPT | Performed by: INTERNAL MEDICINE

## 2024-08-13 PROCEDURE — 2500000004 HC RX 250 GENERAL PHARMACY W/ HCPCS (ALT 636 FOR OP/ED): Performed by: FAMILY MEDICINE

## 2025-01-03 ENCOUNTER — OFFICE VISIT (OUTPATIENT)
Dept: PRIMARY CARE | Facility: CLINIC | Age: 70
End: 2025-01-03
Payer: COMMERCIAL

## 2025-01-03 VITALS
HEIGHT: 66 IN | TEMPERATURE: 97.7 F | BODY MASS INDEX: 36.71 KG/M2 | HEART RATE: 98 BPM | SYSTOLIC BLOOD PRESSURE: 126 MMHG | WEIGHT: 228.4 LBS | DIASTOLIC BLOOD PRESSURE: 78 MMHG | OXYGEN SATURATION: 94 %

## 2025-01-03 DIAGNOSIS — R68.89 FLU-LIKE SYMPTOMS: ICD-10-CM

## 2025-01-03 LAB
POC RAPID INFLUENZA A: NEGATIVE
POC RAPID INFLUENZA B: NEGATIVE
POC SARS-COV-2 AG BINAX: NORMAL

## 2025-01-03 PROCEDURE — 87811 SARS-COV-2 COVID19 W/OPTIC: CPT | Performed by: FAMILY MEDICINE

## 2025-01-03 PROCEDURE — 87804 INFLUENZA ASSAY W/OPTIC: CPT | Performed by: FAMILY MEDICINE

## 2025-01-03 PROCEDURE — 3008F BODY MASS INDEX DOCD: CPT | Performed by: FAMILY MEDICINE

## 2025-01-03 PROCEDURE — 1123F ACP DISCUSS/DSCN MKR DOCD: CPT | Performed by: FAMILY MEDICINE

## 2025-01-03 PROCEDURE — 3078F DIAST BP <80 MM HG: CPT | Performed by: FAMILY MEDICINE

## 2025-01-03 PROCEDURE — 1160F RVW MEDS BY RX/DR IN RCRD: CPT | Performed by: FAMILY MEDICINE

## 2025-01-03 PROCEDURE — 99213 OFFICE O/P EST LOW 20 MIN: CPT | Performed by: FAMILY MEDICINE

## 2025-01-03 PROCEDURE — 1159F MED LIST DOCD IN RCRD: CPT | Performed by: FAMILY MEDICINE

## 2025-01-03 PROCEDURE — 1158F ADVNC CARE PLAN TLK DOCD: CPT | Performed by: FAMILY MEDICINE

## 2025-01-03 PROCEDURE — 3074F SYST BP LT 130 MM HG: CPT | Performed by: FAMILY MEDICINE

## 2025-01-03 RX ORDER — METHYLPREDNISOLONE 4 MG/1
TABLET ORAL
Qty: 21 TABLET | Refills: 0 | Status: SHIPPED | OUTPATIENT
Start: 2025-01-03 | End: 2025-01-09

## 2025-01-03 RX ORDER — ALBUTEROL SULFATE 90 UG/1
2 INHALANT RESPIRATORY (INHALATION) EVERY 4 HOURS PRN
Qty: 8.5 G | Refills: 11 | Status: SHIPPED | OUTPATIENT
Start: 2025-01-03 | End: 2026-01-03

## 2025-01-03 RX ORDER — DOXYCYCLINE 100 MG/1
100 CAPSULE ORAL 2 TIMES DAILY
Qty: 28 CAPSULE | Refills: 0 | Status: SHIPPED | OUTPATIENT
Start: 2025-01-03 | End: 2025-01-17

## 2025-01-03 ASSESSMENT — ENCOUNTER SYMPTOMS
CHILLS: 0
SORE THROAT: 0
SHORTNESS OF BREATH: 1
HEMOPTYSIS: 0
COUGH: 1
HEARTBURN: 0
WHEEZING: 1
FEVER: 0
WEIGHT LOSS: 0
MYALGIAS: 1
RHINORRHEA: 1
HEADACHES: 0
SWEATS: 0

## 2025-01-03 NOTE — PROGRESS NOTES
Subjective   Patient ID: Miki Brown is a 69 y.o. male who presents for Cough (Symptoms- chest congestion, cough, body aches/Symptoms 3 days /Soreness on left side hernia ).  Very pleasant 69-year-old  Upper respiratory and flulike symptoms for the past few days  Harsh cough nasal congestion body aches fatigue    Cough  This is a new problem. The current episode started in the past 7 days. The problem has been gradually worsening. The problem occurs constantly. The cough is Productive of purulent sputum. Associated symptoms include myalgias, nasal congestion, postnasal drip, rhinorrhea, shortness of breath and wheezing. Pertinent negatives include no chest pain, chills, ear congestion, ear pain, fever, headaches, heartburn, hemoptysis, rash, sore throat, sweats or weight loss. Nothing aggravates the symptoms. Risk factors for lung disease include smoking/tobacco exposure.       Review of Systems   Constitutional:  Negative for chills, fever and weight loss.   HENT:  Positive for postnasal drip and rhinorrhea. Negative for ear pain and sore throat.    Respiratory:  Positive for cough, shortness of breath and wheezing. Negative for hemoptysis.    Cardiovascular:  Negative for chest pain.   Gastrointestinal:  Negative for heartburn.   Musculoskeletal:  Positive for myalgias.   Skin:  Negative for rash.   Neurological:  Negative for headaches.   Constitutional: no chills, no fever and no night sweats.   Eyes: no blurred vision and no eyesight problems.   ENT: no hearing loss, no nasal congestion, no nasal discharge, no hoarseness and no sore throat.   Cardiovascular: no chest pain, no intermittent leg claudication, no lower extremity edema, no palpitations and no syncope.   Respiratory: no cough, no shortness of breath during exertion, no shortness of breath at rest and no wheezing.   Gastrointestinal: no abdominal pain, no blood in stools, no constipation, no diarrhea, no melena, no nausea, no rectal pain and no  "vomiting.   Genitourinary: no dysuria, no change in urinary frequency, no urinary hesitancy, no feelings of urinary urgency and no vaginal discharge.   Musculoskeletal: no arthralgias,  no back pain and no myalgias.   Integumentary: no new skin lesions and no rashes.   Neurological: no difficulty walking, no headache, no limb weakness, no numbness and no tingling.   Psychiatric: no anxiety, no depression, no anhedonia and no substance use disorders.   Endocrine: no recent weight gain and no recent weight loss.   Hematologic/Lymphatic: no tendency for easy bruising and no swollen glands .      Objective    /78   Pulse 98   Temp 36.5 °C (97.7 °F)   Ht 1.676 m (5' 6\")   Wt 104 kg (228 lb 6.4 oz)   SpO2 94%   BMI 36.86 kg/m²    Physical Exam  The patient appeared well nourished and normally developed. Vital signs as documented. Head exam is unremarkable. No scleral icterus or corneal arcus noted.  Pupils are equal round reactive to light extraocular movements are intact no hemorrhages noted on funduscopic exam mouth mucous membranes are moist no exudates ears canals clear TMs are gray pearly not injected nose no rhinorrhea or epistaxis Neck is without jugular venous distension, thyromegaly, or carotid bruits. Carotid upstrokes are brisk bilaterally. Lungs are clear to auscultation and percussion. Cardiac exam reveals the PMI to be normally sized and situated. Rhythm is regular. First and second heart sounds normal. No murmurs, rubs or gallops. Abdominal exam reveals normal bowel sounds, no masses, no organomegaly and no aortic enlargement. Extremities are nonedematous and both femoral and pedal pulses are normal.  Neurologic exam DTRs are equal bilaterally no focal deficits strength is symmetrical heme lymph no palpable lymph nodes in the neck axilla or groin  Nose congestion lungs clear Harsh cough  Assessment/Plan   Problem List Items Addressed This Visit       Flu-like symptoms    Relevant Medications    " methylPREDNISolone (Medrol Dospak) 4 mg tablets    albuterol (ProAir HFA) 90 mcg/actuation inhaler    doxycycline (Vibramycin) 100 mg capsule    Other Relevant Orders    POCT BinaxNOW Covid-19 Ag Card manually resulted (Completed)    POCT Influenza A/B manually resulted (Completed)            Nicole Platt MD

## 2025-01-03 NOTE — LETTER
January 3, 2025     Patient: Miki Brown   YOB: 1955   Date of Visit: 1/3/2025       To Whom It May Concern:    It is my medical opinion that Miki Brown  is unable to work today due to illness  .    If you have any questions or concerns, please don't hesitate to call.         Sincerely,        Nicole Platt MD    CC: No Recipients

## 2025-01-05 PROBLEM — R68.89 FLU-LIKE SYMPTOMS: Status: ACTIVE | Noted: 2025-01-05

## 2025-02-04 DIAGNOSIS — E03.8 SECONDARY HYPOTHYROIDISM: ICD-10-CM

## 2025-02-05 RX ORDER — LEVOTHYROXINE SODIUM 50 UG/1
TABLET ORAL
Qty: 90 TABLET | Refills: 3 | Status: SHIPPED | OUTPATIENT
Start: 2025-02-05

## 2025-02-15 ENCOUNTER — OFFICE VISIT (OUTPATIENT)
Dept: URGENT CARE | Age: 70
End: 2025-02-15
Payer: COMMERCIAL

## 2025-02-15 VITALS
OXYGEN SATURATION: 95 % | HEART RATE: 88 BPM | TEMPERATURE: 99.8 F | DIASTOLIC BLOOD PRESSURE: 69 MMHG | RESPIRATION RATE: 24 BRPM | SYSTOLIC BLOOD PRESSURE: 114 MMHG

## 2025-02-15 DIAGNOSIS — J11.1 INFLUENZA: ICD-10-CM

## 2025-02-15 DIAGNOSIS — R05.9 COUGH IN ADULT: Primary | ICD-10-CM

## 2025-02-15 LAB
POC RAPID INFLUENZA A: POSITIVE
POC RAPID INFLUENZA B: NEGATIVE
POC SARS-COV-2 AG BINAX: NORMAL

## 2025-02-15 RX ORDER — OSELTAMIVIR PHOSPHATE 75 MG/1
75 CAPSULE ORAL EVERY 12 HOURS
Qty: 10 CAPSULE | Refills: 0 | Status: SHIPPED | OUTPATIENT
Start: 2025-02-15 | End: 2025-02-20

## 2025-02-15 ASSESSMENT — PAIN SCALES - GENERAL: PAINLEVEL_OUTOF10: 4

## 2025-02-15 ASSESSMENT — ENCOUNTER SYMPTOMS
COUGH: 1
FEVER: 1

## 2025-02-15 NOTE — PROGRESS NOTES
Subjective   Patient ID: Miki Brown is a 69 y.o. male. They present today with a chief complaint of Cough ( Per Pt. Hx coughing and feeling fatigue since yesterday.  Pt stated had walking pneumonia about one month ago.).    History of Present Illness  Pt is a 69 year old male who presented with cough and was found to have influenza he states he has a mdi at home      Cough  Associated symptoms include a fever.       Past Medical History  Allergies as of 02/15/2025    (No Known Allergies)       (Not in a hospital admission)       Past Medical History:   Diagnosis Date    Arthritis     Arthritis 07/28/2024    Atrophic disorder of skin, unspecified 12/10/2013    Atrophoderma    Atrophy of skin 07/28/2024    Comment on above: Added by Problem List Migration; 2013-12-10;      Balanoposthitis 12/10/2013    Balanoposthitis    Balanoposthitis 07/28/2024    Comment on above: Added by Problem List Migration; 2013-12-10;      Bradycardia 07/28/2024    Bradycardia, unspecified 08/19/2014    Bradycardia    Candidiasis 07/28/2024    Comment on above: Added by Problem List Migration; 2013-12-10;      Candidiasis, unspecified 12/10/2013    Candidiasis    Cellulitis 07/28/2024    Comment on above: Added by Problem List Migration; 2013-12-10;      Cellulitis, unspecified 12/10/2013    Cellulitis    Contact dermatitis due to plant 07/28/2024    Comment on above: Added by Problem List Migration; 2013-12-10;      Dehydration 06/16/2024    Disease of thyroid gland     Dyslipidemia 03/21/2023    recheck      Encounter for general adult medical examination without abnormal findings 12/10/2013    Physical exam, routine    Encounter for immunization 07/28/2020    Need for prophylactic vaccination and inoculation against influenza    Flu-like symptoms 03/21/2023    Gastro-esophageal reflux disease without esophagitis 12/10/2013    Esophageal reflux    History of viral infection 07/28/2024    Leg cramps 05/21/2024    Lipoma 07/28/2024     Lipoma 07/28/2024    Low back pain, unspecified 12/10/2013    Lumbago    Mass of neck 07/28/2024    Pain in unspecified knee 12/10/2013    Joint pain, knee    Pathological fracture of vertebra 07/28/2024    Comment on above: Added by Problem List Migration; 2013-12-10;      Pathological fracture, other site, initial encounter for fracture 12/10/2013    Pathologic fracture of vertebrae    Personal history of other diseases of the respiratory system 12/23/2014    History of influenza    Tinea cruris 12/10/2013    Tinea cruris    Unspecified contact dermatitis due to plants, except food 12/10/2013    Contact dermatitis due to plants, except food       Past Surgical History:   Procedure Laterality Date    HAND SURGERY  01/31/2015    Hand Surgery                                                                                                                                                          HERNIA REPAIR          reports that he has been smoking pipe. He has never used smokeless tobacco. He reports current alcohol use of about 2.0 standard drinks of alcohol per week. Drug use questions deferred to the physician.    Review of Systems  Review of Systems   Constitutional:  Positive for fever.   Respiratory:  Positive for cough.                                   Objective    Vitals:    02/15/25 1746   BP: 114/69   BP Location: Right arm   Patient Position: Sitting   BP Cuff Size: Large adult   Pulse: 88   Resp: 24   Temp: 37.7 °C (99.8 °F)   TempSrc: Oral   SpO2: 95%     No LMP for male patient.    Physical Exam  Constitutional:       Appearance: Normal appearance.   HENT:      Right Ear: Tympanic membrane normal.      Left Ear: Tympanic membrane normal.      Mouth/Throat:      Mouth: Mucous membranes are dry.   Eyes:      Pupils: Pupils are equal, round, and reactive to light.   Cardiovascular:      Rate and Rhythm: Normal rate and regular rhythm.   Pulmonary:      Breath sounds: Wheezing present.   Neurological:       Mental Status: He is alert.         Procedures    Point of Care Test & Imaging Results from this visit  Results for orders placed or performed in visit on 02/15/25   POCT Covid-19 Rapid Antigen   Result Value Ref Range    POC RICH-COV-2 AG  Presumptive negative test for SARS-CoV-2 (no antigen detected)     Presumptive negative test for SARS-CoV-2 (no antigen detected)   POCT Influenza A/B manually resulted   Result Value Ref Range    POC Rapid Influenza A Positive (A) Negative    POC Rapid Influenza B Negative Negative      No results found.    Diagnostic study results (if any) were reviewed by Keturah Marie MD.    Assessment/Plan   Allergies, medications, history, and pertinent labs/EKGs/Imaging reviewed by Keturah Marie MD.     Medical Decision Making  Influenza some wheezing  Orders and Diagnoses  Diagnoses and all orders for this visit:  Cough in adult  -     POCT Covid-19 Rapid Antigen  -     POCT Influenza A/B manually resulted  Influenza  -     oseltamivir (Tamiflu) 75 mg capsule; Take 1 capsule (75 mg) by mouth every 12 hours for 5 days.      Medical Admin Record      Patient disposition: Home    Electronically signed by Keturah Mraie MD  6:59 PM

## 2025-02-26 ENCOUNTER — TELEPHONE (OUTPATIENT)
Dept: PRIMARY CARE | Facility: CLINIC | Age: 70
End: 2025-02-26

## 2025-02-26 ENCOUNTER — OFFICE VISIT (OUTPATIENT)
Dept: PRIMARY CARE | Facility: CLINIC | Age: 70
End: 2025-02-26
Payer: COMMERCIAL

## 2025-02-26 ENCOUNTER — HOSPITAL ENCOUNTER (OUTPATIENT)
Dept: RADIOLOGY | Facility: HOSPITAL | Age: 70
Discharge: HOME | End: 2025-02-26
Payer: COMMERCIAL

## 2025-02-26 VITALS
WEIGHT: 226.6 LBS | DIASTOLIC BLOOD PRESSURE: 78 MMHG | HEART RATE: 70 BPM | TEMPERATURE: 98.2 F | OXYGEN SATURATION: 97 % | HEIGHT: 66 IN | RESPIRATION RATE: 20 BRPM | SYSTOLIC BLOOD PRESSURE: 120 MMHG | BODY MASS INDEX: 36.42 KG/M2

## 2025-02-26 DIAGNOSIS — E03.9 ACQUIRED HYPOTHYROIDISM: ICD-10-CM

## 2025-02-26 DIAGNOSIS — R10.12 LEFT UPPER QUADRANT ABDOMINAL PAIN: ICD-10-CM

## 2025-02-26 DIAGNOSIS — R73.03 PREDIABETES: ICD-10-CM

## 2025-02-26 DIAGNOSIS — R10.12 LEFT UPPER QUADRANT ABDOMINAL PAIN: Primary | ICD-10-CM

## 2025-02-26 PROCEDURE — 99214 OFFICE O/P EST MOD 30 MIN: CPT | Performed by: FAMILY MEDICINE

## 2025-02-26 PROCEDURE — 3078F DIAST BP <80 MM HG: CPT | Performed by: FAMILY MEDICINE

## 2025-02-26 PROCEDURE — 74018 RADEX ABDOMEN 1 VIEW: CPT

## 2025-02-26 PROCEDURE — 1159F MED LIST DOCD IN RCRD: CPT | Performed by: FAMILY MEDICINE

## 2025-02-26 PROCEDURE — 3008F BODY MASS INDEX DOCD: CPT | Performed by: FAMILY MEDICINE

## 2025-02-26 PROCEDURE — 1123F ACP DISCUSS/DSCN MKR DOCD: CPT | Performed by: FAMILY MEDICINE

## 2025-02-26 PROCEDURE — 1160F RVW MEDS BY RX/DR IN RCRD: CPT | Performed by: FAMILY MEDICINE

## 2025-02-26 PROCEDURE — 3074F SYST BP LT 130 MM HG: CPT | Performed by: FAMILY MEDICINE

## 2025-02-26 ASSESSMENT — ENCOUNTER SYMPTOMS
ABDOMINAL PAIN: 1
ARTHRALGIAS: 1
ANOREXIA: 0
FLATUS: 1

## 2025-02-26 NOTE — PROGRESS NOTES
Subjective   Patient ID: Miki Brown is a 69 y.o. male who presents for Follow-up (Inguinal hernia surgery last spring - experiencing pain in same region, gas and bloating on left side. States quality of life is terrible since he is in pain all the time now.).  Abdominal Pain  This is a chronic problem. The current episode started more than 1 month ago. The onset quality is gradual. The problem occurs daily. The problem has been gradually worsening. The pain is located in the LLQ. The pain is at a severity of 4/10. The quality of the pain is aching. The abdominal pain radiates to the LUQ. Associated symptoms include arthralgias and flatus. Pertinent negatives include no anorexia. The pain is aggravated by certain positions, coughing and eating. The pain is relieved by Certain positions. Prior diagnostic workup includes surgery.       Review of Systems   Gastrointestinal:  Positive for abdominal pain and flatus. Negative for anorexia.   Musculoskeletal:  Positive for arthralgias.   Constitutional: no chills, no fever and no night sweats.   Eyes: no blurred vision and no eyesight problems.   ENT: no hearing loss, no nasal congestion, no nasal discharge, no hoarseness and no sore throat.   Cardiovascular: no chest pain, no intermittent leg claudication, no lower extremity edema, no palpitations and no syncope.   Respiratory: no cough, no shortness of breath during exertion, no shortness of breath at rest and no wheezing.   Gastrointestinal: no abdominal pain, no blood in stools, no constipation, no diarrhea, no melena, no nausea, no rectal pain and no vomiting.   Genitourinary: no dysuria, no change in urinary frequency, no urinary hesitancy, no feelings of urinary urgency and no vaginal discharge.   Musculoskeletal: no arthralgias,  no back pain and no myalgias.   Integumentary: no new skin lesions and no rashes.   Neurological: no difficulty walking, no headache, no limb weakness, no numbness and no tingling.  "  Psychiatric: no anxiety, no depression, no anhedonia and no substance use disorders.   Endocrine: no recent weight gain and no recent weight loss.   Hematologic/Lymphatic: no tendency for easy bruising and no swollen glands .      Objective    /78   Pulse 70   Temp 36.8 °C (98.2 °F) (Temporal)   Resp 20   Ht 1.676 m (5' 6\")   Wt 103 kg (226 lb 9.6 oz)   SpO2 97%   BMI 36.57 kg/m²    Physical Exam  The patient appeared well nourished and normally developed. Vital signs as documented. Head exam is unremarkable. No scleral icterus or corneal arcus noted.  Pupils are equal round reactive to light extraocular movements are intact no hemorrhages noted on funduscopic exam mouth mucous membranes are moist no exudates ears canals clear TMs are gray pearly not injected nose no rhinorrhea or epistaxis Neck is without jugular venous distension, thyromegaly, or carotid bruits. Carotid upstrokes are brisk bilaterally. Lungs are clear to auscultation and percussion. Cardiac exam reveals the PMI to be normally sized and situated. Rhythm is regular. First and second heart sounds normal. No murmurs, rubs or gallops. Abdominal exam reveals normal bowel sounds, no masses, no organomegaly and no aortic enlargement. Extremities are nonedematous and both femoral and pedal pulses are normal.  Neurologic exam DTRs are equal bilaterally no focal deficits strength is symmetrical heme lymph no palpable lymph nodes in the neck axilla or groin    Assessment/Plan   Problem List Items Addressed This Visit       Prediabetes    Relevant Orders    Hemoglobin A1C (Completed)    Acquired hypothyroidism    Relevant Orders    TSH (Completed)     Other Visit Diagnoses       Left upper quadrant abdominal pain    -  Primary    Relevant Orders    XR abdomen 1 view (Completed)    Comprehensive metabolic panel (Completed)    Hepatitis C antibody (Completed)    CBC and Auto Differential (Completed)                 Nicole Platt MD  "

## 2025-02-26 NOTE — TELEPHONE ENCOUNTER
Pt was in on 2/26/25 and said he was supposed to get a medication to relieve gas and bloating sent to:      [id number] - [place], [place] - [address]  [address] [address]  Phone: [phone] Fax: [phone]

## 2025-02-28 LAB
ALBUMIN SERPL-MCNC: 4.4 G/DL (ref 3.6–5.1)
ALP SERPL-CCNC: 88 U/L (ref 35–144)
ALT SERPL-CCNC: 18 U/L (ref 9–46)
ANION GAP SERPL CALCULATED.4IONS-SCNC: 8 MMOL/L (CALC) (ref 7–17)
AST SERPL-CCNC: 19 U/L (ref 10–35)
BASOPHILS # BLD AUTO: 24 CELLS/UL (ref 0–200)
BASOPHILS NFR BLD AUTO: 0.3 %
BILIRUB SERPL-MCNC: 0.4 MG/DL (ref 0.2–1.2)
BUN SERPL-MCNC: 19 MG/DL (ref 7–25)
CALCIUM SERPL-MCNC: 9.2 MG/DL (ref 8.6–10.3)
CHLORIDE SERPL-SCNC: 104 MMOL/L (ref 98–110)
CO2 SERPL-SCNC: 28 MMOL/L (ref 20–32)
CREAT SERPL-MCNC: 1.15 MG/DL (ref 0.7–1.35)
EGFRCR SERPLBLD CKD-EPI 2021: 69 ML/MIN/1.73M2
EOSINOPHIL # BLD AUTO: 200 CELLS/UL (ref 15–500)
EOSINOPHIL NFR BLD AUTO: 2.5 %
ERYTHROCYTE [DISTWIDTH] IN BLOOD BY AUTOMATED COUNT: 13 % (ref 11–15)
EST. AVERAGE GLUCOSE BLD GHB EST-MCNC: 123 MG/DL
EST. AVERAGE GLUCOSE BLD GHB EST-SCNC: 6.8 MMOL/L
GLUCOSE SERPL-MCNC: 94 MG/DL (ref 65–99)
HBA1C MFR BLD: 5.9 % OF TOTAL HGB
HCT VFR BLD AUTO: 47.9 % (ref 38.5–50)
HCV AB SERPL QL IA: NORMAL
HGB BLD-MCNC: 16.2 G/DL (ref 13.2–17.1)
LYMPHOCYTES # BLD AUTO: 2232 CELLS/UL (ref 850–3900)
LYMPHOCYTES NFR BLD AUTO: 27.9 %
MCH RBC QN AUTO: 31 PG (ref 27–33)
MCHC RBC AUTO-ENTMCNC: 33.8 G/DL (ref 32–36)
MCV RBC AUTO: 91.8 FL (ref 80–100)
MONOCYTES # BLD AUTO: 936 CELLS/UL (ref 200–950)
MONOCYTES NFR BLD AUTO: 11.7 %
NEUTROPHILS # BLD AUTO: 4608 CELLS/UL (ref 1500–7800)
NEUTROPHILS NFR BLD AUTO: 57.6 %
PLATELET # BLD AUTO: 266 THOUSAND/UL (ref 140–400)
PMV BLD REES-ECKER: 11.9 FL (ref 7.5–12.5)
POTASSIUM SERPL-SCNC: 4.4 MMOL/L (ref 3.5–5.3)
PROT SERPL-MCNC: 7.2 G/DL (ref 6.1–8.1)
RBC # BLD AUTO: 5.22 MILLION/UL (ref 4.2–5.8)
SODIUM SERPL-SCNC: 140 MMOL/L (ref 135–146)
TSH SERPL-ACNC: 0.6 MIU/L (ref 0.4–4.5)
WBC # BLD AUTO: 8 THOUSAND/UL (ref 3.8–10.8)

## 2025-03-06 DIAGNOSIS — I10 BENIGN ESSENTIAL HTN: ICD-10-CM

## 2025-03-07 RX ORDER — LISINOPRIL 10 MG/1
10 TABLET ORAL DAILY
Qty: 90 TABLET | Refills: 3 | Status: SHIPPED | OUTPATIENT
Start: 2025-03-07

## 2025-04-08 ENCOUNTER — OFFICE VISIT (OUTPATIENT)
Dept: PRIMARY CARE | Facility: CLINIC | Age: 70
End: 2025-04-08
Payer: COMMERCIAL

## 2025-04-08 VITALS
HEART RATE: 65 BPM | BODY MASS INDEX: 35.36 KG/M2 | WEIGHT: 220 LBS | SYSTOLIC BLOOD PRESSURE: 133 MMHG | HEIGHT: 66 IN | DIASTOLIC BLOOD PRESSURE: 87 MMHG | OXYGEN SATURATION: 95 % | TEMPERATURE: 97.3 F

## 2025-04-08 DIAGNOSIS — J44.9 CHRONIC OBSTRUCTIVE PULMONARY DISEASE, UNSPECIFIED COPD TYPE (MULTI): ICD-10-CM

## 2025-04-08 DIAGNOSIS — J01.00 ACUTE NON-RECURRENT MAXILLARY SINUSITIS: Primary | ICD-10-CM

## 2025-04-08 PROCEDURE — 99213 OFFICE O/P EST LOW 20 MIN: CPT | Performed by: FAMILY MEDICINE

## 2025-04-08 PROCEDURE — 3079F DIAST BP 80-89 MM HG: CPT | Performed by: FAMILY MEDICINE

## 2025-04-08 PROCEDURE — 1123F ACP DISCUSS/DSCN MKR DOCD: CPT | Performed by: FAMILY MEDICINE

## 2025-04-08 PROCEDURE — 1159F MED LIST DOCD IN RCRD: CPT | Performed by: FAMILY MEDICINE

## 2025-04-08 PROCEDURE — 1158F ADVNC CARE PLAN TLK DOCD: CPT | Performed by: FAMILY MEDICINE

## 2025-04-08 PROCEDURE — 3075F SYST BP GE 130 - 139MM HG: CPT | Performed by: FAMILY MEDICINE

## 2025-04-08 PROCEDURE — 1160F RVW MEDS BY RX/DR IN RCRD: CPT | Performed by: FAMILY MEDICINE

## 2025-04-08 PROCEDURE — 3008F BODY MASS INDEX DOCD: CPT | Performed by: FAMILY MEDICINE

## 2025-04-08 RX ORDER — AMOXICILLIN AND CLAVULANATE POTASSIUM 875; 125 MG/1; MG/1
875 TABLET, FILM COATED ORAL 2 TIMES DAILY
Qty: 20 TABLET | Refills: 0 | Status: SHIPPED | OUTPATIENT
Start: 2025-04-08 | End: 2025-04-18

## 2025-04-08 RX ORDER — METHYLPREDNISOLONE 4 MG/1
TABLET ORAL
Qty: 21 TABLET | Refills: 0 | Status: SHIPPED | OUTPATIENT
Start: 2025-04-08 | End: 2025-04-14

## 2025-04-08 ASSESSMENT — ENCOUNTER SYMPTOMS
DEPRESSION: 0
OCCASIONAL FEELINGS OF UNSTEADINESS: 0
LOSS OF SENSATION IN FEET: 0

## 2025-04-08 NOTE — PROGRESS NOTES
"Subjective   Patient ID: Miki Brown is a 69 y.o. male who presents for Sinus Problem (Pt is here for sinus infection and or ear infection ).  HPI    Review of Systems    Objective    /87   Pulse 65   Temp 36.3 °C (97.3 °F)   Ht 1.676 m (5' 6\")   Wt 99.8 kg (220 lb)   SpO2 95%   BMI 35.51 kg/m²    Physical Exam  The patient appeared well nourished and normally developed. Vital signs as documented. Head exam is unremarkable. No scleral icterus or corneal arcus noted.  Pupils are equal round reactive to light extraocular movements are intact no hemorrhages noted on funduscopic exam mouth mucous membranes are moist no exudates ears canals clear TMs are gray pearly not injected nose no rhinorrhea or epistaxis Neck is without jugular venous distension, thyromegaly, or carotid bruits. Carotid upstrokes are brisk bilaterally. Lungs are clear to auscultation and percussion. Cardiac exam reveals the PMI to be normally sized and situated. Rhythm is regular. First and second heart sounds normal. No murmurs, rubs or gallops. Abdominal exam reveals normal bowel sounds, no masses, no organomegaly and no aortic enlargement. Extremities are nonedematous and both femoral and pedal pulses are normal.  Neurologic exam DTRs are equal bilaterally no focal deficits strength is symmetrical heme lymph no palpable lymph nodes in the neck axilla or groin    Assessment/Plan   Problem List Items Addressed This Visit    None           Nicole Platt MD  " No scleral icterus or corneal arcus noted.  Pupils are equal round reactive to light extraocular movements are intact no hemorrhages noted on funduscopic exam mouth mucous membranes are moist no exudates ears canals clear TMs are gray pearly not injected nose no rhinorrhea or epistaxis Neck is without jugular venous distension, thyromegaly, or carotid bruits. Carotid upstrokes are brisk bilaterally. Lungs are clear to auscultation and percussion. Cardiac exam reveals the PMI to be normally sized and situated. Rhythm is regular. First and second heart sounds normal. No murmurs, rubs or gallops. Abdominal exam reveals normal bowel sounds, no masses, no organomegaly and no aortic enlargement. Extremities are nonedematous and both femoral and pedal pulses are normal.  Neurologic exam DTRs are equal bilaterally no focal deficits strength is symmetrical heme lymph no palpable lymph nodes in the neck axilla or groin Swollen mucosa tenderness over maxillary sinuses lungs clear no areas of consolidation    Assessment/Plan   Problem List Items Addressed This Visit       COPD (chronic obstructive pulmonary disease) (Multi)    Stable based on symptoms and exam  Continue current plan of care  No current issues         Acute non-recurrent maxillary sinusitis - Primary    Augmentin 875 twice daily  Medrol Dosepak  Fluids rest symptomatic relief  Follow-up if symptoms do not improve                 Nicole Platt MD

## 2025-04-26 PROBLEM — J01.00 ACUTE NON-RECURRENT MAXILLARY SINUSITIS: Status: ACTIVE | Noted: 2025-04-26

## 2025-04-27 NOTE — ASSESSMENT & PLAN NOTE
Augmentin 875 twice daily  Medrol Dosepak  Fluids rest symptomatic relief  Follow-up if symptoms do not improve

## 2025-08-13 ENCOUNTER — TELEPHONE (OUTPATIENT)
Dept: PRIMARY CARE | Facility: CLINIC | Age: 70
End: 2025-08-13
Payer: COMMERCIAL

## 2025-09-23 ENCOUNTER — APPOINTMENT (OUTPATIENT)
Dept: PRIMARY CARE | Facility: CLINIC | Age: 70
End: 2025-09-23
Payer: COMMERCIAL